# Patient Record
(demographics unavailable — no encounter records)

---

## 2025-03-04 NOTE — DATA REVIEWED
[FreeTextEntry1] : Reviewed reports and images from the most recent breast imaging studies and left breast core biopsy.  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

## 2025-03-04 NOTE — ASSESSMENT
[FreeTextEntry1] : [id number] left breast IDC, poorly differentiated, with breast prognostic profile pending.  The nature of the problem was discussed in full and further management decisions will be pending local review of the breast imaging studies and biopsy slides, medical oncology evaluation, BRCA testing, bilateral breast MRI, and case presentation at the multidisciplinary breast cancer conference.  BRCA testing is indicated in light of her premenopausal status, and the breast MRI will hopefully rule out the presence of any other areas of concern in either breast.  They understand that additional biopsies may be needed based on those results prior to definitive surgery.  We also had a long discussion regarding the details of surgical management options, including mastectomy with or without immediate reconstruction, versus wide local excision with preoperative localization and postoperative breast radiotherapy for management of the index lesion, and formal axillary lymph node dissection versus sentinel lymph node biopsy for surgical staging of the axilla.  The details of these procedures and their risks and benefits were also discussed in detail including cosmetic issues, the increased risk of locally recurrent disease with breast preservation, which could require a mastectomy in the future, and the very small risk of false negative axillary staging with sentinel node biopsy, which could have significant effects on her treatment decisions and possibly her prognosis.  Referrals were provided for the MRI, and consultation with medical oncology and our genetics counselor.  All their questions were answered and they are happy with the assessment and plan, they will return after the above have been completed for further discussion and final management decisions.

## 2025-03-04 NOTE — PHYSICAL EXAM
[Normal Thyroid] : the thyroid was normal [Normal Breath Sounds] : Normal breath sounds [Normal Heart Sounds] : normal heart sounds [Normal Rate and Rhythm] : normal rate and rhythm [No HSM] : no hepatosplenomegaly [de-identified] : Obese but otherwise healthy [de-identified] : No adenopathy [de-identified] : Large, symmetrical, pendulous.  No nipple discharge, nipple retraction, suspicious skin changes noted bilaterally.  No discrete masses or suspicious areas are palpable in either breast.  Healing core biopsy puncture in the left upper inner quadrant as noted, with minimal local ecchymosis and no palpable mass or hematoma.  No axillary adenopathy bilaterally.

## 2025-03-04 NOTE — HISTORY OF PRESENT ILLNESS
[de-identified] : The patient returns to me after an absence of almost 3 years, and comes with her male  following a recent abnormal mammogram which identified a new 13 mm mass in the left breast UIQ.  A subsequent core biopsy showed invasive carcinoma.  She was unaware of any new symptoms or changes in either breast, her only family history of breast cancer is a maternal aunt, and she continues to have regular menstrual cycles.  She has had 2 prior benign biopsies of the right breast. [de-identified] : She has had a recent 25 pound weight loss on Zepbound for the past 3 months.

## 2025-03-05 NOTE — HISTORY OF PRESENT ILLNESS
[Disease: _____________________] : Disease: [unfilled] [T: ___] : T[unfilled] [N: ___] : N[unfilled] [de-identified] :  This is a 47 year old woman who is being evaluated for breast cancer.  She was noted on screening mammogram (1/30/25) to have focal asymmetry in the left. Diagnostic mammogram and ultrasound on 2/10/25 showed at the 10-11 o'clock 7cm from the nipple an irregular mass measuring 0.8cm x 0.9cm x 1.3cm. 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 It was ER: 95 moderate/strong(2+/3+) /[place]: 70 moderate/strong (2+/3+)/HER 2: 0 NEG. Ki-67 index was 60%.  She recently had an appointment with Dr. Canela to discuss surgical options and was ordered an MRI of B/L breast w/wo contrast. Today she is here to discuss medical options for treatment.  Work up is as follows:  1/30/25 B/L Screening Mammogram  MAMMOGRAM FINDINGS: Mammography was performed including the following views: bilateral craniocaudal with tomosynthesis, bilateral mediolateral oblique with tomosynthesis.  The examination includes digital synthetic 2D and digital tomosynthesis 3D images. Additional imaging analysis was performed using CAD (computer-aided detection) software. The breasts are heterogeneously dense, which may obscure small masses. Finding 1:  There is a focal asymmetry seen in the medial left breast. Finding 2:  There are stable circumscribed masses seen in both breasts. IMPRESSION: Finding 1:  Focal asymmetry in the left breast requires additional evaluation. RECOMMENDATION: Patient will be recalled for additional mammographic views and breast ultrasound. Finding 2:  Stable masses in both breasts are benign finding. ASSESSMENT: BI-RADS Category 0:  Incomplete: Needs Additional Imaging Evaluation  2/10/25 Left Diagnostic Mammogram and US  Targeted Left Breast Sonogram: At the 10-11 o'clock location 7 cm from the nipple corresponding to mammographic findings is an irregular hypoechoic mass measuring 0.8 cm x 0.9 cm x 1.3 cm. An ultrasound guided core biopsy is recommended. Evaluation of the left axilla demonstrates normal-appearing lymph nodes. Impression: Left breast mass seen mammographically and sonographically at the 10-11 o'clock location 7 cm from the nipple as above. An ultrasound guided core biopsy is recommended. Recommendation: Ultrasound guided biopsy. BI-RADS Category 4: Suspicious  2/27/25 US Core Biopsy of the Left Breast Pathology:  BREAST, LEFT 10:00 N7 MASS, ULTRASOUND-GUIDED NEEDLE CORE BIOPSIES: - INVASIVE POORLY DIFFERENTIATED DUCTAL CARCINOMA WITH AN ASSOCIATED CHRONIC LYMPHOPLASMACYTIC CELL INFLAMMATORY INFILTRATE. Malignant concordant histology. Recommendation: Surgical consultation.  Addendum Immunohistochemical studies were performed at [facility], [address] [place] [address], [facility] (see NOTE).  The results are as follows:  % POSITIVE  STAINING INTENSITY  ESTROGEN RECEPTOR             95 MODERATE/STRONG (2+/3+) PROGESTERONE RECEPTOR    70 MODERATE/STRONG (2+/3+) Ki-67   60  Comment: No current consensus exists as to the methodological evaluation, optimal cutoff and algorithm of clinical usage of the Ki-67 labeling proliferative index. Proposed cutoffs noted in the literature vary from:  Unfavorable    >10% or >14% or >20% or 30% ([place]). Intermediate    10-14%, 10-20%, 16-30% ([place]). Favorable     <10% or <15% ([place]). A positive test result is defined as positive nuclear staining in >1% of tumor cells.                              A negative test result is defined as nuclear staining in <1% of tumor cells.  Marker           Result  Interpretation HER2               0 Negative  Pt is c/o pain in her left breast. Menstrual cycle is delayed over a week.   [de-identified] : IDC [de-identified] : /[place] + Her2 neg [de-identified] : 3/5/25 Pt being evaluated via Televisit. Consent obtained

## 2025-03-05 NOTE — CONSULT LETTER
[DrNeto  ___] : Dr. CHOE [DrNeto ___] : Dr. CHOE [___] : [unfilled] [Dear  ___] : Dear  [unfilled], [Courtesy Letter:] : I had the pleasure of seeing your patient, [unfilled], in my office today. [Please see my note below.] : Please see my note below. [Consult Closing:] : Thank you very much for allowing me to participate in the care of this patient.  If you have any questions, please do not hesitate to contact me. [Sincerely,] : Sincerely, [FreeTextEntry3] : Joshua Coronel MD  [facility] Attending Physician [facility] [phone]

## 2025-03-05 NOTE — ASSESSMENT
[FreeTextEntry1] : In summary this is a 47 yr old premenopausal woman with Clinical stage 1 ER+ NV+Her2-ve poorly diff IDC of left breast.  PLAN The oncologic history and management course thus far were reviewed in detail with the pt   We had a long discussion reviewing the natural history, staging and rationale for systemic treatment. I explained that the treatment of non-metastatic breast cancer is approached by 2 general principles  i.e local control and systemic control. I explained that she will need surgery either lumpectomy or mastectomy with SLN eval for local treatment. Adjuvant RT may be indicated based on choice of surgical procedure and pathology report. We explained that the goal of adjuvant systemic therapy is to reduce the risk of systemic recurrence or development of metastatic disease. We explained that metastatic disease although treatable is not curable. The object of adjuvant therapy is to reduce the risk of recurrence and mortality from metastatic disease.  We also use systemic treatment in a neoadjuvant approach in certain circumstances.  In order to determine need for neoadjuvant approach an MRI will give us info about the tumor size and extent of disease. We will also order a Mammaprint which will help in the decision process. If she has high MP-2 score neoadjuvant chemotherapy can be considered if tumor size is over 2cm on MRI.  Finally, we discussed the role of endocrine therapy which reduces the risk of both local and systemic recurrence and increases overall survival among women with HR + BC and should be strongly considered as adjuvant therapy for majority of women with ER/NV + BC. Since she is premenopausal we may consider OFS also.  Pt had many questions which were addressed in detail.  She will return for follow up after MRI is completed.

## 2025-03-05 NOTE — CONSULT LETTER
[DrNeto  ___] : Dr. CHOE [DrNeto ___] : Dr. CHOE [___] : [unfilled] [Dear  ___] : Dear  [unfilled], [Courtesy Letter:] : I had the pleasure of seeing your patient, [unfilled], in my office today. [Please see my note below.] : Please see my note below. [Consult Closing:] : Thank you very much for allowing me to participate in the care of this patient.  If you have any questions, please do not hesitate to contact me. [Sincerely,] : Sincerely, [FreeTextEntry3] : Joshua Coronel MD  [facility] Attending Physician [facility] [phone]

## 2025-03-05 NOTE — ASSESSMENT
[FreeTextEntry1] : In summary this is a 47 yr old premenopausal woman with Clinical stage 1 ER+ [place]+Her2-ve poorly diff IDC of left breast.  PLAN The oncologic history and management course thus far were reviewed in detail with the pt   We had a long discussion reviewing the natural history, staging and rationale for systemic treatment. I explained that the treatment of non-metastatic breast cancer is approached by 2 general principles  i.e local control and systemic control. I explained that she will need surgery either lumpectomy or mastectomy with SLN eval for local treatment. Adjuvant RT may be indicated based on choice of surgical procedure and pathology report. We explained that the goal of adjuvant systemic therapy is to reduce the risk of systemic recurrence or development of metastatic disease. We explained that metastatic disease although treatable is not curable. The object of adjuvant therapy is to reduce the risk of recurrence and mortality from metastatic disease.  We also use systemic treatment in a neoadjuvant approach in certain circumstances.  In order to determine need for neoadjuvant approach an MRI will give us info about the tumor size and extent of disease. We will also order a Mammaprint which will help in the decision process. If she has high MP-2 score neoadjuvant chemotherapy can be considered if tumor size is over 2cm on MRI.  Finally, we discussed the role of endocrine therapy which reduces the risk of both local and systemic recurrence and increases overall survival among women with HR + BC and should be strongly considered as adjuvant therapy for majority of women with ER/OH + BC. Since she is premenopausal we may consider OFS also.  Pt had many questions which were addressed in detail.  She will return for follow up after MRI is completed.

## 2025-03-05 NOTE — HISTORY OF PRESENT ILLNESS
[Disease: _____________________] : Disease: [unfilled] [T: ___] : T[unfilled] [N: ___] : N[unfilled] [de-identified] :  This is a 47 year old woman who is being evaluated for breast cancer.  She was noted on screening mammogram (1/30/25) to have focal asymmetry in the left. Diagnostic mammogram and ultrasound on 2/10/25 showed at the 10-11 o'clock 7cm from the nipple an irregular mass measuring 0.8cm x 0.9cm x 1.3cm. 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 It was ER: 95 moderate/strong(2+/3+) /[place]: 70 moderate/strong (2+/3+)/HER 2: 0 NEG. Ki-67 index was 60%.  She recently had an appointment with Dr. Canela to discuss surgical options and was ordered an MRI of B/L breast w/wo contrast. Today she is here to discuss medical options for treatment.  Work up is as follows:  1/30/25 B/L Screening Mammogram  MAMMOGRAM FINDINGS: Mammography was performed including the following views: bilateral craniocaudal with tomosynthesis, bilateral mediolateral oblique with tomosynthesis.  The examination includes digital synthetic 2D and digital tomosynthesis 3D images. Additional imaging analysis was performed using CAD (computer-aided detection) software. The breasts are heterogeneously dense, which may obscure small masses. Finding 1:  There is a focal asymmetry seen in the medial left breast. Finding 2:  There are stable circumscribed masses seen in both breasts. IMPRESSION: Finding 1:  Focal asymmetry in the left breast requires additional evaluation. RECOMMENDATION: Patient will be recalled for additional mammographic views and breast ultrasound. Finding 2:  Stable masses in both breasts are benign finding. ASSESSMENT: BI-RADS Category 0:  Incomplete: Needs Additional Imaging Evaluation  2/10/25 Left Diagnostic Mammogram and US  Targeted Left Breast Sonogram: At the 10-11 o'clock location 7 cm from the nipple corresponding to mammographic findings is an irregular hypoechoic mass measuring 0.8 cm x 0.9 cm x 1.3 cm. An ultrasound guided core biopsy is recommended. Evaluation of the left axilla demonstrates normal-appearing lymph nodes. Impression: Left breast mass seen mammographically and sonographically at the 10-11 o'clock location 7 cm from the nipple as above. An ultrasound guided core biopsy is recommended. Recommendation: Ultrasound guided biopsy. BI-RADS Category 4: Suspicious  2/27/25 US Core Biopsy of the Left Breast Pathology:  BREAST, LEFT 10:00 N7 MASS, ULTRASOUND-GUIDED NEEDLE CORE BIOPSIES: - INVASIVE POORLY DIFFERENTIATED DUCTAL CARCINOMA WITH AN ASSOCIATED CHRONIC LYMPHOPLASMACYTIC CELL INFLAMMATORY INFILTRATE. Malignant concordant histology. Recommendation: Surgical consultation.  Addendum Immunohistochemical studies were performed at [facility], [address] [place] [address], [facility] (see NOTE).  The results are as follows:  % POSITIVE  STAINING INTENSITY  ESTROGEN RECEPTOR             95 MODERATE/STRONG (2+/3+) PROGESTERONE RECEPTOR    70 MODERATE/STRONG (2+/3+) Ki-67   60  Comment: No current consensus exists as to the methodological evaluation, optimal cutoff and algorithm of clinical usage of the Ki-67 labeling proliferative index. Proposed cutoffs noted in the literature vary from:  Unfavorable    >10% or >14% or >20% or 30% ([place]). Intermediate    10-14%, 10-20%, 16-30% ([place]). Favorable     <10% or <15% ([place]). A positive test result is defined as positive nuclear staining in >1% of tumor cells.                              A negative test result is defined as nuclear staining in <1% of tumor cells.  Marker           Result  Interpretation HER2               0 Negative  Pt is c/o pain in her left breast. Menstrual cycle is delayed over a week.   [de-identified] : IDC [de-identified] : /[place] + Her2 neg [de-identified] : 3/5/25 Pt being evaluated via Televisit. Consent obtained

## 2025-03-13 NOTE — DISCUSSION/SUMMARY
[FreeTextEntry1] : REASON FOR VISIT: Ms. Juliann Hernández is a 47-year-old female who was referred by Dr. Joshua Coronel for cancer genetic counseling and risk assessment due to a personal history of breast cancer. The patient was seen on 3/12/2025 through [facility]. The patient was unaccompanied.   RELEVANT MEDICAL AND SURGICAL HISTORY: The patient was recently diagnosed with left breast invasive poorly differentiated ductal carcinoma, ER+/IA+/HER2-, Ki-67 60%. She met with Dr. Coronel (oncology). She will be going for a breast MRI  and following up with Dr. Coronel afterwards.  Disease: Breast Ca   Pathology: IDC   TNM stage: T1c, N0   Tumor Markers: ER+/IA+/HER2    OTHER MEDICAL AND SURGICAL HISTORY: -  HTN   PAST OB/GYN HISTORY: Obstetrical History:  (1 TOP) Age at Menarche: 11 Pre-Menopausal Oral Contraceptive Use: Former use Hormone Replacement Therapy: Denied    CANCER SCREENING HISTORY: Breast: Per pt hx of two breast biopsies in left breast which were inconclusive and had subsequent two lumpectomies. GYN: Last visit 2024. Frequency: annual. Patient reported no abnormalities. Colon: Scheduled for 2025 for baseline colonoscopy. Skin: Denied    SOCIAL HISTORY: Tobacco-product use: Former smoker, quit 2020 Environmental exposure: Denied    FAMILY HISTORY: Paternal ancestry was reported as Eastern /Ashkenazi-Roman Catholic and maternal ancestry was reported as Surinamese/[place]/. A detailed family history of cancer was ascertained, see below for pedigree. Of note: - Maternal aunt with breast and uterine cancer in her 70s/80s - Maternal aunt with abdominal cancer in her late 70s - Maternal aunt with breast cancer in her 80s - Maternal aunt with uterine cancer in her 60s - Maternal aunt with lung cancer -  Maternal grandfather with lung cancer - Father with basal cell    The remaining family history is unremarkable. According to the patient there are no other known cases of significant cancers in the family. To her knowledge no one else in the family has had germline testing for cancer susceptibility.   RISK ASSESSMENT: Ms. Hernández's family history of cancer is suggestive of an inherited predisposition to breast and uterine cancer and related cancers. Variants in breast and uterine cancer susceptibility genes were of specific concern. This test analyzes the following genes:   APC, MADIHA, BAP1, BARD1, BMPR1A, BRCA1, BRCA2, BRIP1, CDH1, CDKN2A, CHEK2, FH, FLCN, MET, MLH1, MSH2, MSH6, MUTYH, NF1, NTHL1, PALB2, PMS2, PTEN, RAD51C, RAD51D, SMAD4, STK11, TP53, TSC1, TSC2 and VHL (sequencing and deletion/duplication); AXIN2, HOXB13, MBD4, MSH3, POLD1 and POLE (sequencing only); EPCAM and GREM1 (deletion/duplication only)   We discussed the risks, benefits and limitations, financial cost and implications of genetic testing. We also discussed the psychosocial implications of genetic testing. Possible test results were reviewed with the patient, along with associated medical management options.   The patient consented to genetic testing for hereditary cancer. A sample was obtained from the patient in our clinic and will be sent to [facility] for analysis.   PLAN: 1. The patient's sample will be sent to [facility] for analysis. 2. We will contact the patient once the results are available. Results generally return in 2-3 weeks.   For any additional questions please call Cancer Genetics at [phone] or [phone].     Michela Gallego MS, [facility] Genetic Counselor, Cancer Genetics

## 2025-03-13 NOTE — DISCUSSION/SUMMARY
[FreeTextEntry1] : REASON FOR VISIT: Ms. Juliann Hernández is a 47-year-old female who was referred by Dr. Joshua Coronel for cancer genetic counseling and risk assessment due to a personal history of breast cancer. The patient was seen on 3/12/2025 through [facility]. The patient was unaccompanied.   RELEVANT MEDICAL AND SURGICAL HISTORY: The patient was recently diagnosed with left breast invasive poorly differentiated ductal carcinoma, ER+/[place]+/HER2-, Ki-67 60%. She met with Dr. Coronel (oncology). She will be going for a breast MRI  and following up with Dr. Coronel afterwards.  Disease: Breast Ca   Pathology: IDC   TNM stage: T1c, N0   Tumor Markers: ER+/NC+/HER2    OTHER MEDICAL AND SURGICAL HISTORY: -  HTN   PAST OB/GYN HISTORY: Obstetrical History:  (1 TOP) Age at Menarche: 11 Pre-Menopausal Oral Contraceptive Use: Former use Hormone Replacement Therapy: Denied    CANCER SCREENING HISTORY: Breast: Per pt hx of two breast biopsies in left breast which were inconclusive and had subsequent two lumpectomies. GYN: Last visit 2024. Frequency: annual. Patient reported no abnormalities. Colon: Scheduled for 2025 for baseline colonoscopy. Skin: Denied    SOCIAL HISTORY: Tobacco-product use: Former smoker, quit 2020 Environmental exposure: Denied    FAMILY HISTORY: Paternal ancestry was reported as Eastern /Ashkenazi-Methodist and maternal ancestry was reported as Lebanese/Macanese/. A detailed family history of cancer was ascertained, see below for pedigree. Of note: - Maternal aunt with breast and uterine cancer in her 70s/80s - Maternal aunt with abdominal cancer in her late 70s - Maternal aunt with breast cancer in her 80s - Maternal aunt with uterine cancer in her 60s - Maternal aunt with lung cancer -  Maternal grandfather with lung cancer - Father with basal cell    The remaining family history is unremarkable. According to the patient there are no other known cases of significant cancers in the family. To her knowledge no one else in the family has had germline testing for cancer susceptibility.   RISK ASSESSMENT: Ms. Hernández's family history of cancer is suggestive of an inherited predisposition to breast and uterine cancer and related cancers. Variants in breast and uterine cancer susceptibility genes were of specific concern. This test analyzes the following genes:   APC, MADIHA, BAP1, BARD1, BMPR1A, BRCA1, BRCA2, BRIP1, CDH1, CDKN2A, CHEK2, FH, FLCN, MET, MLH1, MSH2, MSH6, MUTYH, NF1, NTHL1, PALB2, PMS2, PTEN, RAD51C, RAD51D, SMAD4, STK11, TP53, TSC1, TSC2 and VHL (sequencing and deletion/duplication); AXIN2, HOXB13, MBD4, MSH3, POLD1 and POLE (sequencing only); EPCAM and GREM1 (deletion/duplication only)   We discussed the risks, benefits and limitations, financial cost and implications of genetic testing. We also discussed the psychosocial implications of genetic testing. Possible test results were reviewed with the patient, along with associated medical management options.   The patient consented to genetic testing for hereditary cancer. A sample was obtained from the patient in our clinic and will be sent to [facility] for analysis.   PLAN: 1. The patient's sample will be sent to [facility] for analysis. 2. We will contact the patient once the results are available. Results generally return in 2-3 weeks.   For any additional questions please call Cancer Genetics at [phone] or [phone].     Michela Gallego MS, [facility] [place] Genetic Counselor, Cancer Genetics

## 2025-03-19 NOTE — CONSULT LETTER
[Dear  ___] : Dear  [unfilled], [Courtesy Letter:] : I had the pleasure of seeing your patient, [unfilled], in my office today. [Please see my note below.] : Please see my note below. [Consult Closing:] : Thank you very much for allowing me to participate in the care of this patient.  If you have any questions, please do not hesitate to contact me. [Sincerely,] : Sincerely, [DrNeto  ___] : Dr. CHOE [DrNeto ___] : Dr. CHOE [___] : [unfilled] [FreeTextEntry3] : Joshua Coronel MD  [facility] Attending Physician [facility] [phone]

## 2025-03-19 NOTE — CONSULT LETTER
[Dear  ___] : Dear  [unfilled], [Courtesy Letter:] : I had the pleasure of seeing your patient, [unfilled], in my office today. [Please see my note below.] : Please see my note below. [Consult Closing:] : Thank you very much for allowing me to participate in the care of this patient.  If you have any questions, please do not hesitate to contact me. [Sincerely,] : Sincerely, [DrNeto  ___] : Dr. CHOE [DrNeto ___] : Dr. CHOE [___] : [unfilled] [FreeTextEntry3] : Joshua Coronel MD  [facility] Attending Physician [facility] [phone]  Surgical Defect Length In Cm (Optional): 2.0

## 2025-03-19 NOTE — HISTORY OF PRESENT ILLNESS
[Disease: _____________________] : Disease: [unfilled] [T: ___] : T[unfilled] [N: ___] : N[unfilled] [de-identified] :  This is a 47 year old woman who is being evaluated for breast cancer.  She was noted on screening mammogram (1/30/25) to have focal asymmetry in the left. Diagnostic mammogram and ultrasound on 2/10/25 showed at the 10-11 o'clock 7cm from the nipple an irregular mass measuring 0.8cm x 0.9cm x 1.3cm. 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 It was ER: 95 moderate/strong(2+/3+) /WI: 70 moderate/strong (2+/3+)/HER 2: 0 NEG. Ki-67 index was 60%.  She recently had an appointment with Dr. Canela to discuss surgical options and was ordered an MRI of B/L breast w/wo contrast. Today she is here to discuss medical options for treatment.  Work up is as follows:  1/30/25 B/L Screening Mammogram  MAMMOGRAM FINDINGS: Mammography was performed including the following views: bilateral craniocaudal with tomosynthesis, bilateral mediolateral oblique with tomosynthesis.  The examination includes digital synthetic 2D and digital tomosynthesis 3D images. Additional imaging analysis was performed using CAD (computer-aided detection) software. The breasts are heterogeneously dense, which may obscure small masses. Finding 1:  There is a focal asymmetry seen in the medial left breast. Finding 2:  There are stable circumscribed masses seen in both breasts. IMPRESSION: Finding 1:  Focal asymmetry in the left breast requires additional evaluation. RECOMMENDATION: Patient will be recalled for additional mammographic views and breast ultrasound. Finding 2:  Stable masses in both breasts are benign finding. ASSESSMENT: BI-RADS Category 0:  Incomplete: Needs Additional Imaging Evaluation  2/10/25 Left Diagnostic Mammogram and US  Targeted Left Breast Sonogram: At the 10-11 o'clock location 7 cm from the nipple corresponding to mammographic findings is an irregular hypoechoic mass measuring 0.8 cm x 0.9 cm x 1.3 cm. An ultrasound guided core biopsy is recommended. Evaluation of the left axilla demonstrates normal-appearing lymph nodes. Impression: Left breast mass seen mammographically and sonographically at the 10-11 o'clock location 7 cm from the nipple as above. An ultrasound guided core biopsy is recommended. Recommendation: Ultrasound guided biopsy. BI-RADS Category 4: Suspicious  2/27/25 US Core Biopsy of the Left Breast Pathology:  BREAST, LEFT 10:00 N7 MASS, ULTRASOUND-GUIDED NEEDLE CORE BIOPSIES: - INVASIVE POORLY DIFFERENTIATED DUCTAL CARCINOMA WITH AN ASSOCIATED CHRONIC LYMPHOPLASMACYTIC CELL INFLAMMATORY INFILTRATE. Malignant concordant histology. Recommendation: Surgical consultation.  Addendum Immunohistochemical studies were performed at [facility], [address] [place] [address], [facility] (see NOTE).  The results are as follows:  % POSITIVE  STAINING INTENSITY  ESTROGEN RECEPTOR             95 MODERATE/STRONG (2+/3+) PROGESTERONE RECEPTOR    70 MODERATE/STRONG (2+/3+) Ki-67   60  Comment: No current consensus exists as to the methodological evaluation, optimal cutoff and algorithm of clinical usage of the Ki-67 labeling proliferative index. Proposed cutoffs noted in the literature vary from:  Unfavorable    >10% or >14% or >20% or 30% ([place]). Intermediate    10-14%, 10-20%, 16-30% ([place]). Favorable     <10% or <15% ([place]). A positive test result is defined as positive nuclear staining in >1% of tumor cells.                              A negative test result is defined as nuclear staining in <1% of tumor cells.  Marker           Result  Interpretation HER2               0 Negative  Pt is c/o pain in her left breast. Menstrual cycle is delayed over a week.   [de-identified] : IDC [de-identified] : /MO + Her2 neg [de-identified] : 3/5/25 Pt being evaluated via Televisit. Consent obtained  3/17/25 Pt is here for follow up. She had MRI breast and mammaprint was ordered

## 2025-03-19 NOTE — PHYSICAL EXAM
[Fully active, able to carry on all pre-disease performance without restriction] : Status 0 - Fully active, able to carry on all pre-disease performance without restriction [Normal] : affect appropriate [de-identified] : No palpable masses

## 2025-03-19 NOTE — REASON FOR VISIT
[Follow-Up Visit] : a follow-up [Initial Consultation] : an initial consultation [FreeTextEntry2] : Breast CA

## 2025-03-19 NOTE — ASSESSMENT
[FreeTextEntry1] : In summary this is a 47 yr old premenopausal woman with Clinical stage II T2N0 ER+ AL+Her2-ve poorly diff IDC of left breast. MAMMAPRINT SHOWS HIGH RISK -1 LUMINAL subtype Probability of pCR wih neoadjuvant chemotherapy 6% MRI showed a non-mass enhancement in inferior RT breast, MRI guided biopsy rec PLAN The oncologic history and management course thus far were reviewed in detail with the pt   We had a long discussion reviewing the natural history, staging and rationale for systemic treatment. I explained that the treatment of non-metastatic breast cancer is approached by 2 general principles  i.e local control and systemic control. Given the MP- HIGH RISK -1 result she can proceed with surgery first and systemic chemotherapy options will be discussed once final pathology is reviewed. I explained that  surgery could be either lumpectomy or mastectomy with SLN eval.  This will be discussed further by Dr Canela.  Adjuvant RT may be indicated based on choice of surgical procedure and pathology report.  We explained that the goal of adjuvant systemic therapy is to reduce the risk of systemic recurrence or development of metastatic disease. We explained that metastatic disease although treatable is not curable. The object of adjuvant therapy is to reduce the risk of recurrence and mortality from metastatic disease.   Finally, we discussed the role of endocrine therapy which reduces the risk of both local and systemic recurrence and increases overall survival among women with HR + BC and should be strongly considered as adjuvant therapy for majority of women with ER/AL + BC. Since she is premenopausal, we may consider OFS also.  I also discussed the case with Dr Canela who is arranging MRI guided biopsy of RT breast. Genetic testing has been completed and results are pending.  Pt had many questions which were addressed in detail.  RTC after surgery

## 2025-03-19 NOTE — PHYSICAL EXAM
[Fully active, able to carry on all pre-disease performance without restriction] : Status 0 - Fully active, able to carry on all pre-disease performance without restriction [Normal] : affect appropriate [de-identified] : No palpable masses

## 2025-03-19 NOTE — ASSESSMENT
[FreeTextEntry1] : In summary this is a 47 yr old premenopausal woman with Clinical stage II T2N0 ER+ RI+Her2-ve poorly diff IDC of left breast. MAMMAPRINT SHOWS HIGH RISK -1 LUMINAL subtype Probability of pCR wih neoadjuvant chemotherapy 6% MRI showed a non-mass enhancement in inferior RT breast, MRI guided biopsy rec PLAN The oncologic history and management course thus far were reviewed in detail with the pt   We had a long discussion reviewing the natural history, staging and rationale for systemic treatment. I explained that the treatment of non-metastatic breast cancer is approached by 2 general principles  i.e local control and systemic control. Given the MP- HIGH RISK -1 result she can proceed with surgery first and systemic chemotherapy options will be discussed once final pathology is reviewed. I explained that  surgery could be either lumpectomy or mastectomy with SLN eval.  This will be discussed further by Dr Canela.  Adjuvant RT may be indicated based on choice of surgical procedure and pathology report.  We explained that the goal of adjuvant systemic therapy is to reduce the risk of systemic recurrence or development of metastatic disease. We explained that metastatic disease although treatable is not curable. The object of adjuvant therapy is to reduce the risk of recurrence and mortality from metastatic disease.   Finally, we discussed the role of endocrine therapy which reduces the risk of both local and systemic recurrence and increases overall survival among women with HR + BC and should be strongly considered as adjuvant therapy for majority of women with ER/RI + BC. Since she is premenopausal, we may consider OFS also.  I also discussed the case with Dr Canela who is arranging MRI guided biopsy of RT breast. Genetic testing has been completed and results are pending.  Pt had many questions which were addressed in detail.  RTC after surgery

## 2025-03-19 NOTE — HISTORY OF PRESENT ILLNESS
[Disease: _____________________] : Disease: [unfilled] [T: ___] : T[unfilled] [N: ___] : N[unfilled] [de-identified] :  This is a 47 year old woman who is being evaluated for breast cancer.  She was noted on screening mammogram (1/30/25) to have focal asymmetry in the left. Diagnostic mammogram and ultrasound on 2/10/25 showed at the 10-11 o'clock 7cm from the nipple an irregular mass measuring 0.8cm x 0.9cm x 1.3cm. 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 It was ER: 95 moderate/strong(2+/3+) /NV: 70 moderate/strong (2+/3+)/HER 2: 0 NEG. Ki-67 index was 60%.  She recently had an appointment with Dr. Canela to discuss surgical options and was ordered an MRI of B/L breast w/wo contrast. Today she is here to discuss medical options for treatment.  Work up is as follows:  1/30/25 B/L Screening Mammogram  MAMMOGRAM FINDINGS: Mammography was performed including the following views: bilateral craniocaudal with tomosynthesis, bilateral mediolateral oblique with tomosynthesis.  The examination includes digital synthetic 2D and digital tomosynthesis 3D images. Additional imaging analysis was performed using CAD (computer-aided detection) software. The breasts are heterogeneously dense, which may obscure small masses. Finding 1:  There is a focal asymmetry seen in the medial left breast. Finding 2:  There are stable circumscribed masses seen in both breasts. IMPRESSION: Finding 1:  Focal asymmetry in the left breast requires additional evaluation. RECOMMENDATION: Patient will be recalled for additional mammographic views and breast ultrasound. Finding 2:  Stable masses in both breasts are benign finding. ASSESSMENT: BI-RADS Category 0:  Incomplete: Needs Additional Imaging Evaluation  2/10/25 Left Diagnostic Mammogram and US  Targeted Left Breast Sonogram: At the 10-11 o'clock location 7 cm from the nipple corresponding to mammographic findings is an irregular hypoechoic mass measuring 0.8 cm x 0.9 cm x 1.3 cm. An ultrasound guided core biopsy is recommended. Evaluation of the left axilla demonstrates normal-appearing lymph nodes. Impression: Left breast mass seen mammographically and sonographically at the 10-11 o'clock location 7 cm from the nipple as above. An ultrasound guided core biopsy is recommended. Recommendation: Ultrasound guided biopsy. BI-RADS Category 4: Suspicious  2/27/25 US Core Biopsy of the Left Breast Pathology:  BREAST, LEFT 10:00 N7 MASS, ULTRASOUND-GUIDED NEEDLE CORE BIOPSIES: - INVASIVE POORLY DIFFERENTIATED DUCTAL CARCINOMA WITH AN ASSOCIATED CHRONIC LYMPHOPLASMACYTIC CELL INFLAMMATORY INFILTRATE. Malignant concordant histology. Recommendation: Surgical consultation.  Addendum Immunohistochemical studies were performed at [facility], [address] [place] [address], [facility] (see NOTE).  The results are as follows:  % POSITIVE  STAINING INTENSITY  ESTROGEN RECEPTOR             95 MODERATE/STRONG (2+/3+) PROGESTERONE RECEPTOR    70 MODERATE/STRONG (2+/3+) Ki-67   60  Comment: No current consensus exists as to the methodological evaluation, optimal cutoff and algorithm of clinical usage of the Ki-67 labeling proliferative index. Proposed cutoffs noted in the literature vary from:  Unfavorable    >10% or >14% or >20% or 30% ([place]). Intermediate    10-14%, 10-20%, 16-30% ([place]). Favorable     <10% or <15% ([place]). A positive test result is defined as positive nuclear staining in >1% of tumor cells.                              A negative test result is defined as nuclear staining in <1% of tumor cells.  Marker           Result  Interpretation HER2               0 Negative  Pt is c/o pain in her left breast. Menstrual cycle is delayed over a week.   [de-identified] : IDC [de-identified] : /DE + Her2 neg [de-identified] : 3/5/25 Pt being evaluated via Televisit. Consent obtained  3/17/25 Pt is here for follow up. She had MRI breast and mammaprint was ordered

## 2025-03-30 NOTE — HISTORY OF PRESENT ILLNESS
[de-identified] : The patient returns for final discussion regarding the management of her left breast carcinoma.  She notes no new symptoms or changes in either breast and was not reexamined at this office visit.  Breast MRI showed the index lesion in the left breast, and a 3 cm area of non-mass enhancement in the right breast for which a biopsy is scheduled for tomorrow under MRI guidance.  No other breast lesions were noted and no regional adenopathy was appreciated.  BRCA testing has been submitted and results are still pending.  Index lesion is a 13 mm poorly differentiated IDC, ER and IL positive, H ER 2 negative, Ki-67 60%.  MammaPrint shows this to be a luminal B lesion, high risk type I

## 2025-03-30 NOTE — ASSESSMENT
[FreeTextEntry1] : [id number] Left breast IDC, with a favorable prognostic profile with the exception of the Ki-67 and MammaPrint index.  The nature of the problem was reviewed in detail, along with her management options.  Pending the results of the remaining testing, we will proceed with definitive surgery.  We discussed mastectomy, with or without immediate reconstruction as opposed to wide local excision with preoperative localization and postop breast radiotherapy for management of the index lesion.  We discussed axillary lymph node dissection versus sentinel lymph node biopsy for surgical staging of the axilla.  The details of these procedures and their relative risks and benefits were outlined in full.  She understands the increased risk of local recurrence with breast preservation, which could possibly require a mastectomy but will likely not change her overall life expectancy if it does occur.  We discussed the possibility of a false negative sentinel lymph node biopsy, which would have obvious effects on her staging, her treatment decisions, and possibly her prognosis.  All her questions were answered and she wishes to proceed with breast preservation and sentinel node biopsy.  An appropriate surgical consent was obtained and the procedure will be scheduled promptly.

## 2025-03-31 NOTE — DISCUSSION/SUMMARY
[FreeTextEntry1] : REASON FOR VISIT: Ms. Juliann Hernández is a 47-year-old female who was called on 3/31/2025 for a discussion regarding her uncertain genetic test results related to hereditary cancer predisposition.    RELEVANT MEDICAL AND SURGICAL HISTORY: The patient was recently diagnosed with left breast invasive poorly differentiated ductal carcinoma, ER+/ME+/HER2-, Ki-67 60%. She met with Dr. Coronel (oncology). She will be going for a breast MRI  and following up with Dr. Coronel afterwards.  Disease: Breast Ca Pathology: IDC TNM stage: T1c, N0 Tumor Markers: ER+/ME+/HER2  OTHER MEDICAL AND SURGICAL HISTORY: - HTN  PAST OB/GYN HISTORY: Obstetrical History:  (1 TOP) Age at Menarche: 11 Pre-Menopausal Oral Contraceptive Use: Former use Hormone Replacement Therapy: Denied  CANCER SCREENING HISTORY: Breast: Per pt hx of two breast biopsies in left breast which were inconclusive and had subsequent two lumpectomies. GYN: Last visit 2024. Frequency: annual. Patient reported no abnormalities. Colon: Scheduled for 2025 for baseline colonoscopy. Skin: Denied  SOCIAL HISTORY: Tobacco-product use: Former smoker, quit  Environmental exposure: Denied  FAMILY HISTORY: Paternal ancestry was reported as Eastern /Ashkenazi-Confucianism and maternal ancestry was reported as Tajik/Finnish/. A detailed family history of cancer was ascertained, see below for pedigree. Of note: - Maternal aunt with breast and uterine cancer in her 70s/80s - Maternal aunt with abdominal cancer in her late 70s - Maternal aunt with breast cancer in her 80s - Maternal aunt with uterine cancer in her 60s - Maternal aunt with lung cancer - Maternal grandfather with lung cancer - Father with basal cell  The remaining family history is unremarkable. According to the patient there are no other known cases of significant cancers in the family. To her knowledge no one else in the family has had germline testing for cancer susceptibility.  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 Hernández's uncertain genetic test result. At this time the available evidence is insufficient to determine the role of this variant in disease and the clinical significance of these results are uncertain. The AXIN2 gene is associated with autosomal dominant oligodontia-colorectal cancer syndrome. It is unknown if the patient has an increased risk for the cancers associated with AXIN2. Of note, the patient denied any personal history of oligodontia or family history of colon cancer.   The detection of this VUS does NOT currently change the patient's medical management. It is NOT recommended at this time that family members use this result for predictive genetic testing or medical management decisions. With more research, a VUS may be reclassified as either disease-causing or benign. The patient was encouraged to contact us every 2-3 years to enquire about any new information for this variant, or sooner if there are any changes in her personal or family history of cancer.  Such updates could possibly change our risk assessment and recommendations.   Given Ms. Hernández's current reported personal and family history of cancer, and her uncertain genetic test results, the following screening guidelines and risk-reducing recommendations were discussed:  Breast: Long-term management and surveillance should be based on the patients on- or post-treatment protocol as recommended by her surgeons and/or oncologist.  Other: In the absence of other indications, the patient should practice age-appropriate cancer screening for all other organ systems as recommended for the general population.   It is recommended that the patient discuss the importance of pursuing cancer genetic testing and counseling with her other relatives. There may be a pathogenic variant in the family which the patient did not inherit. We would be happy to meet with her family members or refer them to a genetic expert in their area.   We also discussed that, while no clear cause of the patient's personal and family history of cancer was identified, this result, while reassuring, does entirely not rule out a hereditary cancer risk in the patient. It is possible, although unlikely, the patient has a mutation in one of the genes tested that is not detectable by this analysis, or has a mutation in a different gene, either known or unknown. It is also possible there is a hereditary cancer predisposition in the family, but the patient did not inherit it.    Our knowledge of genetics and inherited cancer conditions is changing rapidly, therefore, we recommend that the patient contact our office, every 2 to 3 years, to discuss relevant advances in cancer genetics. We emphasize the importance of re-contacting us with updates regarding her personal and family history of cancer as well as any updates regarding additional cancer genetic test results performed for the patient and/or family members. Such updates could possibly change our risk assessment and recommendations.   PLAN: 1. Long-term management and surveillance should be based on the patient's personal and family history and general population guidelines for all other cancers. 2. A copy of the genetic test results is available to the patient in the [employer] portal. 3. The patient was encouraged to contact us every 2-3 years to discuss relevant advances in cancer genetics, or sooner if there are any changes in her personal or family history of cancer.   For any additional questions please call Cancer Genetics at [phone] or [phone].     Michela Gallego MS, [facility] Genetic Counselor, Cancer Genetics

## 2025-04-08 NOTE — REASON FOR VISIT
Daughter was given all Dr ERICK Castellanos's annotations and orders and verbalized understanding.  [facility] to RN fax now and as soon as it was received we will place on Dr ERICK Castellanos's desk to review.  Pt will see Franck Sousa NP 4/9/25 and then will see his cardiologist 4/10/25 Dr Abraham . Dr Whitehead office has daughter holding the Losartan for now and they sent a lower dose of the Cardizem to pts pharmacy today, daughter not sure of the dose but will  tonight   [Consultation] : a consultation visit [Friend] : friend

## 2025-04-28 NOTE — ASSESSMENT
[FreeTextEntry1] : [id number] G3 left breast IDC, poorly differentiated but with no LVI, hormone receptor positive, H ER 2 negative high Ki-67.  The nature of the problem was discussed in full.  The presence of the individual tumor cells in the node does not change her staging and should not affect her overall management decisions.  The margin positivity was also discussed in detail and prior to any adjuvant treatment, we will plan for reexcision of the lumpectomy site margins as noted.  The details of this procedure were discussed in full and all her questions were answered.  We will also plan for medical and radiation oncology evaluations.  She will likely be recommended to receive adjuvant endocrine therapy but the question of systemic chemotherapy is still to be settled.  All of her questions were answered and an appropriate surgical consent was obtained.  Local care instructions were reviewed for the interim and she will contact me promptly should any other questions or problems arise.

## 2025-04-28 NOTE — DATA REVIEWED
[FreeTextEntry1] : Final pathology shows an 18 mm G3 invasive ductal carcinoma, poorly differentiated, with associated DCIS but no LVI.  The margins are clear of the invasive component but there is DCIS involving the lateral inferior margin.  Prognostic profile is ER and OR positive, H ER 2 negative, Ki-67 60%.  2 sentinel lymph nodes were retrieved 1 of which was benign and 1 of which showed a minute cluster of individual tumor cells.

## 2025-04-28 NOTE — HISTORY OF PRESENT ILLNESS
[de-identified] : The patient returns for her first postoperative visit after her recent left breast lumpectomy and sentinel node biopsy.  She looks and feels well and has no specific complaints regarding the surgery.  She did not require any prescription analgesics.

## 2025-04-28 NOTE — PHYSICAL EXAM
[Normal Thyroid] : the thyroid was normal [Normal Breath Sounds] : Normal breath sounds [Normal Heart Sounds] : normal heart sounds [Normal Rate and Rhythm] : normal rate and rhythm [de-identified] : Healthy [de-identified] : No adenopathy [de-identified] : Large and symmetrical.  Right breast examination is unremarkable.  Left breast reveals 2 surgical incisions which are clean and dry, with no evidence of hematoma or infection.  There is a palpable seroma at the lumpectomy site which was not aspirated.

## 2025-06-06 NOTE — BEGINNING OF VISIT
[Other reason not done] : Other reason not done [Former] : Former [0-4] : 0-4 [< 15 Years] : < 15 Years [Date Discussed (MM/DD/YY): ___] : Discussed: [unfilled] [With Patient/Caregiver] : with Patient/Caregiver [de-identified] : sees therapist

## 2025-06-06 NOTE — BEGINNING OF VISIT
[Other reason not done] : Other reason not done [Former] : Former [0-4] : 0-4 [< 15 Years] : < 15 Years [Date Discussed (MM/DD/YY): ___] : Discussed: [unfilled] [With Patient/Caregiver] : with Patient/Caregiver [de-identified] : sees therapist

## 2025-06-06 NOTE — BEGINNING OF VISIT
[Other reason not done] : Other reason not done [Former] : Former [0-4] : 0-4 [< 15 Years] : < 15 Years [Date Discussed (MM/DD/YY): ___] : Discussed: [unfilled] [With Patient/Caregiver] : with Patient/Caregiver [de-identified] : sees therapist

## 2025-06-06 NOTE — BEGINNING OF VISIT
[Other reason not done] : Other reason not done [Former] : Former [0-4] : 0-4 [< 15 Years] : < 15 Years [Date Discussed (MM/DD/YY): ___] : Discussed: [unfilled] [With Patient/Caregiver] : with Patient/Caregiver [de-identified] : sees therapist

## 2025-06-06 NOTE — BEGINNING OF VISIT
[Other reason not done] : Other reason not done [Former] : Former [0-4] : 0-4 [< 15 Years] : < 15 Years [Date Discussed (MM/DD/YY): ___] : Discussed: [unfilled] [With Patient/Caregiver] : with Patient/Caregiver [de-identified] : sees therapist

## 2025-06-08 NOTE — ASSESSMENT
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  The lesion is ER positive and H ER 2 negative with a high Ki-67 and an Oncotype RS of 32.  Full local care and activity instructions were reviewed and she will return here in about 4 weeks.  She will follow-up with the oncologist regarding her adjuvant treatment and a radiation oncology consultation will also be obtained.  Although one of her margins is only clear by 1 mm, I do not see the benefit of reexcision as it is highly likely to be negative, along with the fact that she will be receiving both adjuvant chemotherapy followed by breast radiation and subsequent endocrine therapy.  This was discussed in detail and all her questions were answered.

## 2025-06-08 NOTE — DATA REVIEWED
[FreeTextEntry1] : Oncotype recurrence score was 32 and she has already discussed this with the oncologist with plans for adjuvant chemotherapy.  Final pathology shows residual DCIS in each of the reexcision specimens, but the margins were clean, at 4 mm in 1 specimen and 1 mm in the other

## 2025-06-08 NOTE — PHYSICAL EXAM
[de-identified] : There is mild flattening at the lumpectomy site and the incision is clean and dry.  There is a palpable seroma which was not aspirated.  The axillary incision also continues to heal without problems.

## 2025-06-08 NOTE — HISTORY OF PRESENT ILLNESS
[de-identified] : The patient returns for her first postoperative visit after the recent left breast reexcision for margin positivity.  She looks well and has no complaints regarding the surgery other than some minor postoperative discomfort.

## 2025-06-13 NOTE — HISTORY OF PRESENT ILLNESS
[Disease: _____________________] : Disease: [unfilled] [T: ___] : T[unfilled] [N: ___] : N[unfilled] [de-identified] :  This is a 47 year old woman who is being evaluated for breast cancer.  She was noted on screening mammogram (1/30/25) to have focal asymmetry in the left. Diagnostic mammogram and ultrasound on 2/10/25 showed at the 10-11 o'clock 7cm from the nipple an irregular mass measuring 0.8cm x 0.9cm x 1.3cm. 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 It was ER: 95 moderate/strong(2+/3+) /IA: 70 moderate/strong (2+/3+)/HER 2: 0 NEG. Ki-67 index was 60%.  She recently had an appointment with Dr. Canela to discuss surgical options and was ordered an MRI of B/L breast w/wo contrast. Today she is here to discuss medical options for treatment.  Work up is as follows:  1/30/25 B/L Screening Mammogram  MAMMOGRAM FINDINGS: Mammography was performed including the following views: bilateral craniocaudal with tomosynthesis, bilateral mediolateral oblique with tomosynthesis.  The examination includes digital synthetic 2D and digital tomosynthesis 3D images. Additional imaging analysis was performed using CAD (computer-aided detection) software. The breasts are heterogeneously dense, which may obscure small masses. Finding 1:  There is a focal asymmetry seen in the medial left breast. Finding 2:  There are stable circumscribed masses seen in both breasts. IMPRESSION: Finding 1:  Focal asymmetry in the left breast requires additional evaluation. RECOMMENDATION: Patient will be recalled for additional mammographic views and breast ultrasound. Finding 2:  Stable masses in both breasts are benign finding. ASSESSMENT: BI-RADS Category 0:  Incomplete: Needs Additional Imaging Evaluation  2/10/25 Left Diagnostic Mammogram and US  Targeted Left Breast Sonogram: At the 10-11 o'clock location 7 cm from the nipple corresponding to mammographic findings is an irregular hypoechoic mass measuring 0.8 cm x 0.9 cm x 1.3 cm. An ultrasound guided core biopsy is recommended. Evaluation of the left axilla demonstrates normal-appearing lymph nodes. Impression: Left breast mass seen mammographically and sonographically at the 10-11 o'clock location 7 cm from the nipple as above. An ultrasound guided core biopsy is recommended. Recommendation: Ultrasound guided biopsy. BI-RADS Category 4: Suspicious  2/27/25 US Core Biopsy of the Left Breast Pathology:  BREAST, LEFT 10:00 N7 MASS, ULTRASOUND-GUIDED NEEDLE CORE BIOPSIES: - INVASIVE POORLY DIFFERENTIATED DUCTAL CARCINOMA WITH AN ASSOCIATED CHRONIC LYMPHOPLASMACYTIC CELL INFLAMMATORY INFILTRATE. Malignant concordant histology. Recommendation: Surgical consultation.  Addendum Immunohistochemical studies were performed at [facility], [address] [place] [address], [facility] (see NOTE).  The results are as follows:  % POSITIVE  STAINING INTENSITY  ESTROGEN RECEPTOR             95 MODERATE/STRONG (2+/3+) PROGESTERONE RECEPTOR    70 MODERATE/STRONG (2+/3+) Ki-67   60  Comment: No current consensus exists as to the methodological evaluation, optimal cutoff and algorithm of clinical usage of the Ki-67 labeling proliferative index. Proposed cutoffs noted in the literature vary from:  Unfavorable    >10% or >14% or >20% or 30% ([place]). Intermediate    10-14%, 10-20%, 16-30% ([place]). Favorable     <10% or <15% ([place]). A positive test result is defined as positive nuclear staining in >1% of tumor cells.                              A negative test result is defined as nuclear staining in <1% of tumor cells.  Marker           Result  Interpretation HER2               0 Negative  Pt is c/o pain in her left breast. Menstrual cycle is delayed over a week.   [de-identified] : IDC [de-identified] : /TX + Her2 neg [de-identified] : 3/5/25 Pt being evaluated via Televisit. Consent obtained  3/17/25 Pt is here for follow up. She had MRI breast and mammaprint was ordered  6/2/25  Patient is here for follow up for Breast CA Completed Lumpectomy revision on 5/20/25 Completed [facility] consult on 5/13/25 Last menstrual period on 6/2/25

## 2025-06-13 NOTE — HISTORY OF PRESENT ILLNESS
[Disease: _____________________] : Disease: [unfilled] [T: ___] : T[unfilled] [N: ___] : N[unfilled] [de-identified] :  This is a 47 year old woman who is being evaluated for breast cancer.  She was noted on screening mammogram (1/30/25) to have focal asymmetry in the left. Diagnostic mammogram and ultrasound on 2/10/25 showed at the 10-11 o'clock 7cm from the nipple an irregular mass measuring 0.8cm x 0.9cm x 1.3cm. 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 It was ER: 95 moderate/strong(2+/3+) /WV: 70 moderate/strong (2+/3+)/HER 2: 0 NEG. Ki-67 index was 60%.  She recently had an appointment with Dr. Canela to discuss surgical options and was ordered an MRI of B/L breast w/wo contrast. Today she is here to discuss medical options for treatment.  Work up is as follows:  1/30/25 B/L Screening Mammogram  MAMMOGRAM FINDINGS: Mammography was performed including the following views: bilateral craniocaudal with tomosynthesis, bilateral mediolateral oblique with tomosynthesis.  The examination includes digital synthetic 2D and digital tomosynthesis 3D images. Additional imaging analysis was performed using CAD (computer-aided detection) software. The breasts are heterogeneously dense, which may obscure small masses. Finding 1:  There is a focal asymmetry seen in the medial left breast. Finding 2:  There are stable circumscribed masses seen in both breasts. IMPRESSION: Finding 1:  Focal asymmetry in the left breast requires additional evaluation. RECOMMENDATION: Patient will be recalled for additional mammographic views and breast ultrasound. Finding 2:  Stable masses in both breasts are benign finding. ASSESSMENT: BI-RADS Category 0:  Incomplete: Needs Additional Imaging Evaluation  2/10/25 Left Diagnostic Mammogram and US  Targeted Left Breast Sonogram: At the 10-11 o'clock location 7 cm from the nipple corresponding to mammographic findings is an irregular hypoechoic mass measuring 0.8 cm x 0.9 cm x 1.3 cm. An ultrasound guided core biopsy is recommended. Evaluation of the left axilla demonstrates normal-appearing lymph nodes. Impression: Left breast mass seen mammographically and sonographically at the 10-11 o'clock location 7 cm from the nipple as above. An ultrasound guided core biopsy is recommended. Recommendation: Ultrasound guided biopsy. BI-RADS Category 4: Suspicious  2/27/25 US Core Biopsy of the Left Breast Pathology:  BREAST, LEFT 10:00 N7 MASS, ULTRASOUND-GUIDED NEEDLE CORE BIOPSIES: - INVASIVE POORLY DIFFERENTIATED DUCTAL CARCINOMA WITH AN ASSOCIATED CHRONIC LYMPHOPLASMACYTIC CELL INFLAMMATORY INFILTRATE. Malignant concordant histology. Recommendation: Surgical consultation.  Addendum Immunohistochemical studies were performed at [facility], [address] [place] [address], [facility] (see NOTE).  The results are as follows:  % POSITIVE  STAINING INTENSITY  ESTROGEN RECEPTOR             95 MODERATE/STRONG (2+/3+) PROGESTERONE RECEPTOR    70 MODERATE/STRONG (2+/3+) Ki-67   60  Comment: No current consensus exists as to the methodological evaluation, optimal cutoff and algorithm of clinical usage of the Ki-67 labeling proliferative index. Proposed cutoffs noted in the literature vary from:  Unfavorable    >10% or >14% or >20% or 30% ([place]). Intermediate    10-14%, 10-20%, 16-30% ([place]). Favorable     <10% or <15% ([place]). A positive test result is defined as positive nuclear staining in >1% of tumor cells.                              A negative test result is defined as nuclear staining in <1% of tumor cells.  Marker           Result  Interpretation HER2               0 Negative  Pt is c/o pain in her left breast. Menstrual cycle is delayed over a week.   [de-identified] : IDC [de-identified] : /OR + Her2 neg [de-identified] : 3/5/25 Pt being evaluated via Televisit. Consent obtained  3/17/25 Pt is here for follow up. She had MRI breast and mammaprint was ordered  6/2/25  Patient is here for follow up for Breast CA Completed Lumpectomy revision on 5/20/25 Completed [facility] consult on 5/13/25 Last menstrual period on 6/2/25

## 2025-06-13 NOTE — CONSULT LETTER
[Dear  ___] : Dear  [unfilled], [Courtesy Letter:] : I had the pleasure of seeing your patient, [unfilled], in my office today. [Please see my note below.] : Please see my note below. [Consult Closing:] : Thank you very much for allowing me to participate in the care of this patient.  If you have any questions, please do not hesitate to contact me. [Sincerely,] : Sincerely, [DrNeto  ___] : Dr. CHOE [DrNeto ___] : Dr. CHOE [___] : [unfilled] [FreeTextEntry3] : Joshua Coronel MD  [facility] \[facility] [place] [facility]\""/[facility] Attending Physician [facility] [phone]

## 2025-06-13 NOTE — CONSULT LETTER
[Dear  ___] : Dear  [unfilled], [Courtesy Letter:] : I had the pleasure of seeing your patient, [unfilled], in my office today. [Please see my note below.] : Please see my note below. [Consult Closing:] : Thank you very much for allowing me to participate in the care of this patient.  If you have any questions, please do not hesitate to contact me. [Sincerely,] : Sincerely, [DrNeto  ___] : Dr. CHOE [DrNeto ___] : Dr. CHOE [___] : [unfilled] [FreeTextEntry3] : Joshua Coronel MD  [facility] Attending Physician [facility] [phone]

## 2025-06-13 NOTE — HISTORY OF PRESENT ILLNESS
[Disease: _____________________] : Disease: [unfilled] [T: ___] : T[unfilled] [N: ___] : N[unfilled] [de-identified] :  This is a 47 year old woman who is being evaluated for breast cancer.  She was noted on screening mammogram (1/30/25) to have focal asymmetry in the left. Diagnostic mammogram and ultrasound on 2/10/25 showed at the 10-11 o'clock 7cm from the nipple an irregular mass measuring 0.8cm x 0.9cm x 1.3cm. 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 It was ER: 95 moderate/strong(2+/3+) /ND: 70 moderate/strong (2+/3+)/HER 2: 0 NEG. Ki-67 index was 60%.  She recently had an appointment with Dr. Canela to discuss surgical options and was ordered an MRI of B/L breast w/wo contrast. Today she is here to discuss medical options for treatment.  Work up is as follows:  1/30/25 B/L Screening Mammogram  MAMMOGRAM FINDINGS: Mammography was performed including the following views: bilateral craniocaudal with tomosynthesis, bilateral mediolateral oblique with tomosynthesis.  The examination includes digital synthetic 2D and digital tomosynthesis 3D images. Additional imaging analysis was performed using CAD (computer-aided detection) software. The breasts are heterogeneously dense, which may obscure small masses. Finding 1:  There is a focal asymmetry seen in the medial left breast. Finding 2:  There are stable circumscribed masses seen in both breasts. IMPRESSION: Finding 1:  Focal asymmetry in the left breast requires additional evaluation. RECOMMENDATION: Patient will be recalled for additional mammographic views and breast ultrasound. Finding 2:  Stable masses in both breasts are benign finding. ASSESSMENT: BI-RADS Category 0:  Incomplete: Needs Additional Imaging Evaluation  2/10/25 Left Diagnostic Mammogram and US  Targeted Left Breast Sonogram: At the 10-11 o'clock location 7 cm from the nipple corresponding to mammographic findings is an irregular hypoechoic mass measuring 0.8 cm x 0.9 cm x 1.3 cm. An ultrasound guided core biopsy is recommended. Evaluation of the left axilla demonstrates normal-appearing lymph nodes. Impression: Left breast mass seen mammographically and sonographically at the 10-11 o'clock location 7 cm from the nipple as above. An ultrasound guided core biopsy is recommended. Recommendation: Ultrasound guided biopsy. BI-RADS Category 4: Suspicious  2/27/25 US Core Biopsy of the Left Breast Pathology:  BREAST, LEFT 10:00 N7 MASS, ULTRASOUND-GUIDED NEEDLE CORE BIOPSIES: - INVASIVE POORLY DIFFERENTIATED DUCTAL CARCINOMA WITH AN ASSOCIATED CHRONIC LYMPHOPLASMACYTIC CELL INFLAMMATORY INFILTRATE. Malignant concordant histology. Recommendation: Surgical consultation.  Addendum Immunohistochemical studies were performed at [facility], [address] [place] [address], [facility] (see NOTE).  The results are as follows:  % POSITIVE  STAINING INTENSITY  ESTROGEN RECEPTOR             95 MODERATE/STRONG (2+/3+) PROGESTERONE RECEPTOR    70 MODERATE/STRONG (2+/3+) Ki-67   60  Comment: No current consensus exists as to the methodological evaluation, optimal cutoff and algorithm of clinical usage of the Ki-67 labeling proliferative index. Proposed cutoffs noted in the literature vary from:  Unfavorable    >10% or >14% or >20% or 30% ([place]). Intermediate    10-14%, 10-20%, 16-30% ([place]). Favorable     <10% or <15% ([place]). A positive test result is defined as positive nuclear staining in >1% of tumor cells.                              A negative test result is defined as nuclear staining in <1% of tumor cells.  Marker           Result  Interpretation HER2               0 Negative  Pt is c/o pain in her left breast. Menstrual cycle is delayed over a week.   [de-identified] : IDC [de-identified] : /[place] + Her2 neg [de-identified] : 3/5/25 Pt being evaluated via Televisit. Consent obtained  3/17/25 Pt is here for follow up. She had MRI breast and mammaprint was ordered  6/2/25  Patient is here for follow up for Breast CA Completed Lumpectomy revision on 5/20/25 Completed [facility] consult on 5/13/25 Last menstrual period on 6/2/25

## 2025-06-13 NOTE — CONSULT LETTER
[Dear  ___] : Dear  [unfilled], [Courtesy Letter:] : I had the pleasure of seeing your patient, [unfilled], in my office today. [Please see my note below.] : Please see my note below. [Consult Closing:] : Thank you very much for allowing me to participate in the care of this patient.  If you have any questions, please do not hesitate to contact me. [Sincerely,] : Sincerely, [DrNeto  ___] : Dr. CHOE [DrNeto ___] : Dr. CHOE [___] : [unfilled] [FreeTextEntry3] : Joshua Coronel MD  [facility] [place]/[facility] Attending Physician [facility] [phone]

## 2025-06-13 NOTE — ASSESSMENT
[FreeTextEntry1] : In summary this is a 47 yr old premenopausal woman with pT1c, p(sn)N0(i+), pMx ER+ MS+Her2-ve Ki-67: 60%, poorly diff IDC of left breast s/p left breast lumpectomy on 4/11/25. Pt needed rexcision d/t positive margins done on 5/20/25 MAMMAPRINT SHOWS HIGH RISK -1 LUMINAL subtype B  PLAN The oncologic history and management course thus far were reviewed in detail with the pt   We had a long discussion reviewing the natural history, staging and rationale for systemic treatment. I explained that the treatment of non-metastatic breast cancer is approached by 2 general principles  i.e local control and systemic control. Given the MP- HIGH RISK -1 result she would benefit from systemic chemotherapy , absolute benefit 6% ( 3-8%) in addition to hormonal therapy.  We explained that the goal of adjuvant systemic therapy is to reduce the risk of systemic recurrence or development of metastatic disease. We explained that metastatic disease although treatable is not curable. The object of adjuvant therapy is to reduce the risk of recurrence and mortality from metastatic disease.   Pt would like to avoid chemotherapy if possible. I discussed  trial (OFSET) with her which is testing OFS/AI with or without chemotherapy in premenopausal women with Oncotype RS </=25. She wanted to participate and was consented. Oncotype will be ordered  Finally, we discussed the role of endocrine therapy which reduces the risk of both local and systemic recurrence and increases overall survival among women with HR + BC and should be strongly considered as adjuvant therapy for majority of women with ER/MS + BC. Since she is premenopausal, she will receive AI+ OFS . Last menstrual period is 6/2/25    Obtain pregnancy test ( needed for trial)  Pt had many questions which were addressed in detail. Adjuvant RT is indicated, follow with Rad/Onc  RTC in 3 weeks

## 2025-06-13 NOTE — CONSULT LETTER
[Dear  ___] : Dear  [unfilled], [Courtesy Letter:] : I had the pleasure of seeing your patient, [unfilled], in my office today. [Please see my note below.] : Please see my note below. [Consult Closing:] : Thank you very much for allowing me to participate in the care of this patient.  If you have any questions, please do not hesitate to contact me. [Sincerely,] : Sincerely, [DrNeto  ___] : Dr. CHOE [DrNeto ___] : Dr. CHOE [___] : [unfilled] [FreeTextEntry3] : Joshua Coronel MD  [facility] Attending Physician [facility] [phone]

## 2025-06-13 NOTE — ASSESSMENT
[FreeTextEntry1] : In summary this is a 47 yr old premenopausal woman with pT1c, p(sn)N0(i+), pMx ER+ IN+Her2-ve Ki-67: 60%, poorly diff IDC of left breast s/p left breast lumpectomy on 4/11/25. Pt needed rexcision d/t positive margins done on 5/20/25 MAMMAPRINT SHOWS HIGH RISK -1 LUMINAL subtype B  PLAN The oncologic history and management course thus far were reviewed in detail with the pt   We had a long discussion reviewing the natural history, staging and rationale for systemic treatment. I explained that the treatment of non-metastatic breast cancer is approached by 2 general principles  i.e local control and systemic control. Given the MP- HIGH RISK -1 result she would benefit from systemic chemotherapy , absolute benefit 6% ( 3-8%) in addition to hormonal therapy.  We explained that the goal of adjuvant systemic therapy is to reduce the risk of systemic recurrence or development of metastatic disease. We explained that metastatic disease although treatable is not curable. The object of adjuvant therapy is to reduce the risk of recurrence and mortality from metastatic disease.   Pt would like to avoid chemotherapy if possible. I discussed  trial (OFSET) with her which is testing OFS/AI with or without chemotherapy in premenopausal women with Oncotype RS </=25. She wanted to participate and was consented. Oncotype will be ordered  Finally, we discussed the role of endocrine therapy which reduces the risk of both local and systemic recurrence and increases overall survival among women with HR + BC and should be strongly considered as adjuvant therapy for majority of women with ER/IN + BC. Since she is premenopausal, she will receive AI+ OFS . Last menstrual period is 6/2/25    Obtain pregnancy test ( needed for trial)  Pt had many questions which were addressed in detail. Adjuvant RT is indicated, follow with Rad/Onc  RTC in 3 weeks

## 2025-06-13 NOTE — CONSULT LETTER
[Dear  ___] : Dear  [unfilled], [Courtesy Letter:] : I had the pleasure of seeing your patient, [unfilled], in my office today. [Please see my note below.] : Please see my note below. [Consult Closing:] : Thank you very much for allowing me to participate in the care of this patient.  If you have any questions, please do not hesitate to contact me. [Sincerely,] : Sincerely, [DrNeto  ___] : Dr. CHOE [DrNeto ___] : Dr. CHOE [___] : [unfilled] [FreeTextEntry3] : Joshua Coronel MD  [facility] [place]/[facility] Attending Physician [facility] [phone]

## 2025-06-13 NOTE — ASSESSMENT
[FreeTextEntry1] : In summary this is a 47 yr old premenopausal woman with pT1c, p(sn)N0(i+), pMx ER+ HI+Her2-ve Ki-67: 60%, poorly diff IDC of left breast s/p left breast lumpectomy on 4/11/25. Pt needed rexcision d/t positive margins done on 5/20/25 MAMMAPRINT SHOWS HIGH RISK -1 LUMINAL subtype B  PLAN The oncologic history and management course thus far were reviewed in detail with the pt   We had a long discussion reviewing the natural history, staging and rationale for systemic treatment. I explained that the treatment of non-metastatic breast cancer is approached by 2 general principles  i.e local control and systemic control. Given the MP- HIGH RISK -1 result she would benefit from systemic chemotherapy , absolute benefit 6% ( 3-8%) in addition to hormonal therapy.  We explained that the goal of adjuvant systemic therapy is to reduce the risk of systemic recurrence or development of metastatic disease. We explained that metastatic disease although treatable is not curable. The object of adjuvant therapy is to reduce the risk of recurrence and mortality from metastatic disease.   Pt would like to avoid chemotherapy if possible. I discussed  trial (OFSET) with her which is testing OFS/AI with or without chemotherapy in premenopausal women with Oncotype RS </=25. She wanted to participate and was consented. Oncotype will be ordered  Finally, we discussed the role of endocrine therapy which reduces the risk of both local and systemic recurrence and increases overall survival among women with HR + BC and should be strongly considered as adjuvant therapy for majority of women with ER/HI + BC. Since she is premenopausal, she will receive AI+ OFS . Last menstrual period is 6/2/25    Obtain pregnancy test ( needed for trial)  Pt had many questions which were addressed in detail. Adjuvant RT is indicated, follow with Rad/Onc  RTC in 3 weeks

## 2025-06-13 NOTE — ASSESSMENT
[FreeTextEntry1] : In summary this is a 47 yr old premenopausal woman with pT1c, p(sn)N0(i+), pMx ER+ [place]+Her2-ve Ki-67: 60%, poorly diff IDC of left breast s/p left breast lumpectomy on 4/11/25. Pt needed rexcision d/t positive margins done on 5/20/25 MAMMAPRINT SHOWS HIGH RISK -1 LUMINAL subtype B  PLAN The oncologic history and management course thus far were reviewed in detail with the pt   We had a long discussion reviewing the natural history, staging and rationale for systemic treatment. I explained that the treatment of non-metastatic breast cancer is approached by 2 general principles  i.e local control and systemic control. Given the MP- HIGH RISK -1 result she would benefit from systemic chemotherapy , absolute benefit 6% ( 3-8%) in addition to hormonal therapy.  We explained that the goal of adjuvant systemic therapy is to reduce the risk of systemic recurrence or development of metastatic disease. We explained that metastatic disease although treatable is not curable. The object of adjuvant therapy is to reduce the risk of recurrence and mortality from metastatic disease.   Pt would like to avoid chemotherapy if possible. I discussed  trial (OFSET) with her which is testing OFS/AI with or without chemotherapy in premenopausal women with Oncotype RS </=25. She wanted to participate and was consented. Oncotype will be ordered  Finally, we discussed the role of endocrine therapy which reduces the risk of both local and systemic recurrence and increases overall survival among women with HR + BC and should be strongly considered as adjuvant therapy for majority of women with ER/TN + BC. Since she is premenopausal, she will receive AI+ OFS . Last menstrual period is 6/2/25    Obtain pregnancy test ( needed for trial)  Pt had many questions which were addressed in detail. Adjuvant RT is indicated, follow with Rad/Onc  RTC in 3 weeks

## 2025-06-13 NOTE — PHYSICAL EXAM
[Fully active, able to carry on all pre-disease performance without restriction] : Status 0 - Fully active, able to carry on all pre-disease performance without restriction [Normal] : affect appropriate [de-identified] : No palpable masses

## 2025-06-13 NOTE — PHYSICAL EXAM
[Fully active, able to carry on all pre-disease performance without restriction] : Status 0 - Fully active, able to carry on all pre-disease performance without restriction [Normal] : affect appropriate [de-identified] : No palpable masses

## 2025-06-13 NOTE — HISTORY OF PRESENT ILLNESS
[Disease: _____________________] : Disease: [unfilled] [T: ___] : T[unfilled] [N: ___] : N[unfilled] [de-identified] :  This is a 47 year old woman who is being evaluated for breast cancer.  She was noted on screening mammogram (1/30/25) to have focal asymmetry in the left. Diagnostic mammogram and ultrasound on 2/10/25 showed at the 10-11 o'clock 7cm from the nipple an irregular mass measuring 0.8cm x 0.9cm x 1.3cm. 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 It was ER: 95 moderate/strong(2+/3+) /[place]: 70 moderate/strong (2+/3+)/HER 2: 0 NEG. Ki-67 index was 60%.  She recently had an appointment with Dr. Canela to discuss surgical options and was ordered an MRI of B/L breast w/wo contrast. Today she is here to discuss medical options for treatment.  Work up is as follows:  1/30/25 B/L Screening Mammogram  MAMMOGRAM FINDINGS: Mammography was performed including the following views: bilateral craniocaudal with tomosynthesis, bilateral mediolateral oblique with tomosynthesis.  The examination includes digital synthetic 2D and digital tomosynthesis 3D images. Additional imaging analysis was performed using CAD (computer-aided detection) software. The breasts are heterogeneously dense, which may obscure small masses. Finding 1:  There is a focal asymmetry seen in the medial left breast. Finding 2:  There are stable circumscribed masses seen in both breasts. IMPRESSION: Finding 1:  Focal asymmetry in the left breast requires additional evaluation. RECOMMENDATION: Patient will be recalled for additional mammographic views and breast ultrasound. Finding 2:  Stable masses in both breasts are benign finding. ASSESSMENT: BI-RADS Category 0:  Incomplete: Needs Additional Imaging Evaluation  2/10/25 Left Diagnostic Mammogram and US  Targeted Left Breast Sonogram: At the 10-11 o'clock location 7 cm from the nipple corresponding to mammographic findings is an irregular hypoechoic mass measuring 0.8 cm x 0.9 cm x 1.3 cm. An ultrasound guided core biopsy is recommended. Evaluation of the left axilla demonstrates normal-appearing lymph nodes. Impression: Left breast mass seen mammographically and sonographically at the 10-11 o'clock location 7 cm from the nipple as above. An ultrasound guided core biopsy is recommended. Recommendation: Ultrasound guided biopsy. BI-RADS Category 4: Suspicious  2/27/25 US Core Biopsy of the Left Breast Pathology:  BREAST, LEFT 10:00 N7 MASS, ULTRASOUND-GUIDED NEEDLE CORE BIOPSIES: - INVASIVE POORLY DIFFERENTIATED DUCTAL CARCINOMA WITH AN ASSOCIATED CHRONIC LYMPHOPLASMACYTIC CELL INFLAMMATORY INFILTRATE. Malignant concordant histology. Recommendation: Surgical consultation.  Addendum Immunohistochemical studies were performed at [facility], [address] [place] [address], [facility] (see NOTE).  The results are as follows:  % POSITIVE  STAINING INTENSITY  ESTROGEN RECEPTOR             95 MODERATE/STRONG (2+/3+) PROGESTERONE RECEPTOR    70 MODERATE/STRONG (2+/3+) Ki-67   60  Comment: No current consensus exists as to the methodological evaluation, optimal cutoff and algorithm of clinical usage of the Ki-67 labeling proliferative index. Proposed cutoffs noted in the literature vary from:  Unfavorable    >10% or >14% or >20% or 30% ([place]). Intermediate    10-14%, 10-20%, 16-30% ([place]). Favorable     <10% or <15% ([place]). A positive test result is defined as positive nuclear staining in >1% of tumor cells.                              A negative test result is defined as nuclear staining in <1% of tumor cells.  Marker           Result  Interpretation HER2               0 Negative  Pt is c/o pain in her left breast. Menstrual cycle is delayed over a week.   [de-identified] : IDC [de-identified] : /IN + Her2 neg [de-identified] : 3/5/25 Pt being evaluated via Televisit. Consent obtained  3/17/25 Pt is here for follow up. She had MRI breast and mammaprint was ordered  6/2/25  Patient is here for follow up for Breast CA Completed Lumpectomy revision on 5/20/25 Completed [facility] consult on 5/13/25 Last menstrual period on 6/2/25

## 2025-06-13 NOTE — ASSESSMENT
[FreeTextEntry1] : In summary this is a 47 yr old premenopausal woman with pT1c, p(sn)N0(i+), pMx ER+ [place]+Her2-ve Ki-67: 60%, poorly diff IDC of left breast s/p left breast lumpectomy on 4/11/25. Pt needed rexcision d/t positive margins done on 5/20/25 MAMMAPRINT SHOWS HIGH RISK -1 LUMINAL subtype B  PLAN The oncologic history and management course thus far were reviewed in detail with the pt   We had a long discussion reviewing the natural history, staging and rationale for systemic treatment. I explained that the treatment of non-metastatic breast cancer is approached by 2 general principles  i.e local control and systemic control. Given the MP- HIGH RISK -1 result she would benefit from systemic chemotherapy , absolute benefit 6% ( 3-8%) in addition to hormonal therapy.  We explained that the goal of adjuvant systemic therapy is to reduce the risk of systemic recurrence or development of metastatic disease. We explained that metastatic disease although treatable is not curable. The object of adjuvant therapy is to reduce the risk of recurrence and mortality from metastatic disease.   Pt would like to avoid chemotherapy if possible. I discussed  trial (OFSET) with her which is testing OFS/AI with or without chemotherapy in premenopausal women with Oncotype RS </=25. She wanted to participate and was consented. Oncotype will be ordered  Finally, we discussed the role of endocrine therapy which reduces the risk of both local and systemic recurrence and increases overall survival among women with HR + BC and should be strongly considered as adjuvant therapy for majority of women with ER/[place] + BC. Since she is premenopausal, she will receive AI+ OFS . Last menstrual period is 6/2/25    Obtain pregnancy test ( needed for trial)  Pt had many questions which were addressed in detail. Adjuvant RT is indicated, follow with Rad/Onc  RTC in 3 weeks

## 2025-06-13 NOTE — PHYSICAL EXAM
[Fully active, able to carry on all pre-disease performance without restriction] : Status 0 - Fully active, able to carry on all pre-disease performance without restriction [Normal] : affect appropriate [de-identified] : No palpable masses

## 2025-06-13 NOTE — PHYSICAL EXAM
[Fully active, able to carry on all pre-disease performance without restriction] : Status 0 - Fully active, able to carry on all pre-disease performance without restriction [Normal] : affect appropriate [de-identified] : No palpable masses

## 2025-06-13 NOTE — HISTORY OF PRESENT ILLNESS
[Disease: _____________________] : Disease: [unfilled] [T: ___] : T[unfilled] [N: ___] : N[unfilled] [de-identified] : IDC [de-identified] :  This is a 47 year old woman who is being evaluated for breast cancer.  She was noted on screening mammogram (1/30/25) to have focal asymmetry in the left. Diagnostic mammogram and ultrasound on 2/10/25 showed at the 10-11 o'clock 7cm from the nipple an irregular mass measuring 0.8cm x 0.9cm x 1.3cm. 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 It was ER: 95 moderate/strong(2+/3+) /[place]: 70 moderate/strong (2+/3+)/HER 2: 0 NEG. Ki-67 index was 60%.  She recently had an appointment with Dr. Canela to discuss surgical options and was ordered an MRI of B/L breast w/wo contrast. Today she is here to discuss medical options for treatment.  Work up is as follows:  1/30/25 B/L Screening Mammogram  MAMMOGRAM FINDINGS: Mammography was performed including the following views: bilateral craniocaudal with tomosynthesis, bilateral mediolateral oblique with tomosynthesis.  The examination includes digital synthetic 2D and digital tomosynthesis 3D images. Additional imaging analysis was performed using CAD (computer-aided detection) software. The breasts are heterogeneously dense, which may obscure small masses. Finding 1:  There is a focal asymmetry seen in the medial left breast. Finding 2:  There are stable circumscribed masses seen in both breasts. IMPRESSION: Finding 1:  Focal asymmetry in the left breast requires additional evaluation. RECOMMENDATION: Patient will be recalled for additional mammographic views and breast ultrasound. Finding 2:  Stable masses in both breasts are benign finding. ASSESSMENT: BI-RADS Category 0:  Incomplete: Needs Additional Imaging Evaluation  2/10/25 Left Diagnostic Mammogram and US  Targeted Left Breast Sonogram: At the 10-11 o'clock location 7 cm from the nipple corresponding to mammographic findings is an irregular hypoechoic mass measuring 0.8 cm x 0.9 cm x 1.3 cm. An ultrasound guided core biopsy is recommended. Evaluation of the left axilla demonstrates normal-appearing lymph nodes. Impression: Left breast mass seen mammographically and sonographically at the 10-11 o'clock location 7 cm from the nipple as above. An ultrasound guided core biopsy is recommended. Recommendation: Ultrasound guided biopsy. BI-RADS Category 4: Suspicious  2/27/25 US Core Biopsy of the Left Breast Pathology:  BREAST, LEFT 10:00 N7 MASS, ULTRASOUND-GUIDED NEEDLE CORE BIOPSIES: - INVASIVE POORLY DIFFERENTIATED DUCTAL CARCINOMA WITH AN ASSOCIATED CHRONIC LYMPHOPLASMACYTIC CELL INFLAMMATORY INFILTRATE. Malignant concordant histology. Recommendation: Surgical consultation.  Addendum Immunohistochemical studies were performed at [facility], [address] [place] [address], [facility] (see NOTE).  The results are as follows:  % POSITIVE  STAINING INTENSITY  ESTROGEN RECEPTOR             95 MODERATE/STRONG (2+/3+) PROGESTERONE RECEPTOR    70 MODERATE/STRONG (2+/3+) Ki-67   60  Comment: No current consensus exists as to the methodological evaluation, optimal cutoff and algorithm of clinical usage of the Ki-67 labeling proliferative index. Proposed cutoffs noted in the literature vary from:  Unfavorable    >10% or >14% or >20% or 30% ([place]). Intermediate    10-14%, 10-20%, 16-30% ([place]). Favorable     <10% or <15% ([place]). A positive test result is defined as positive nuclear staining in >1% of tumor cells.                              A negative test result is defined as nuclear staining in <1% of tumor cells.  Marker           Result  Interpretation HER2               0 Negative  Pt is c/o pain in her left breast. Menstrual cycle is delayed over a week.   [de-identified] : /MD + Her2 neg [de-identified] : 3/5/25 Pt being evaluated via Televisit. Consent obtained  3/17/25 Pt is here for follow up. She had MRI breast and mammaprint was ordered  6/2/25  Patient is here for follow up for Breast CA Completed Lumpectomy revision on 5/20/25 [place] [facility] [place]13/25 Last menstrual period on 6/2/25

## 2025-06-13 NOTE — PHYSICAL EXAM
[Fully active, able to carry on all pre-disease performance without restriction] : Status 0 - Fully active, able to carry on all pre-disease performance without restriction [Normal] : affect appropriate [de-identified] : No palpable masses

## 2025-06-19 NOTE — ASSESSMENT
[FreeTextEntry1] : In summary this is a 47 yr old premenopausal woman with pT1c, p(sn)N0(i+), pMx ER+ [place]+Her2-ve Ki-67: 60%, poorly diff IDC of left breast s/p left breast lumpectomy on 4/11/25. Pt needed rexcision d/t positive margins done on 5/20/25 MAMMAPRINT SHOWS HIGH RISK -1 LUMINAL subtype B Oncotype RS 32  PLAN The oncologic history and management course thus far were reviewed in detail with the pt   We had a long discussion reviewing the natural history, staging and rationale for systemic treatment. I explained that the treatment of non-metastatic breast cancer is approached by 2 general principles  i.e local control and systemic control. Given the MP- HIGH RISK -1 result she would benefit from systemic chemotherapy , absolute benefit 6% ( 3-8%) in addition to hormonal therapy.  We also discussed the Oncotype Dx results at length. Her Oncotype score is high and predicts significant benefit from chemotherapy.  We explained that the goal of adjuvant systemic therapy is to reduce the risk of systemic recurrence or development of metastatic disease. We explained that metastatic disease although treatable is not curable. The object of adjuvant therapy is to reduce the risk of recurrence and mortality from metastatic disease.   We recommended AC followed by Taxol Adriamycin and cytoxan will be administered every 2 weeks X 4 with Neulasta support followed by taxol weekly x 12  We discussed side effects which include but are not limited to nausea, vomiting, hair loss, bone marrow suppression, cytopenia, increased risk of infection, sepsis, diarrhea, fatigue, allergic reaction and peripheral neuropathy. Other side effects such as cardiac toxicity,bone marrow injury leading to MDS/AML, were also discussed, however these risks are very small. Overall the risk benefit ratio favors treatment with chemotherapy   Monitoring will be with each cycle Q 2 weeks with CBC (with differential and platelet count), serum electrolytes, serum creatinine and BUN, CrCl, LFTs; signs/symptoms of hypersensitivity reactions.   SUPPORTIVE MEASURES  Zofran 8mg Q 8 hrs prn for emesis and nausea.  Compazine 10 mg Q 6hrs prn  GI prophylaxis with PPI  Probiotics daily   Finally, we discussed the role of endocrine therapy which reduces the risk of both local and systemic recurrence and increases overall survival among women with HR + BC and should be strongly considered as adjuvant therapy for majority of women with ER/[place] + BC. Since she is premenopausal, she will receive AI+ OFS . Last menstrual period is 6/2/25 She will be started on Lupron today  Adjuvant RT is indicated, follow with Rad/Onc  Plan: Will need echo Will set up for port placement Labs today: CBC, CMP, PT/INR/PTT

## 2025-06-19 NOTE — HISTORY OF PRESENT ILLNESS
[de-identified] :  This is a 47 year old woman who is being evaluated for breast cancer.  She was noted on screening mammogram (1/30/25) to have focal asymmetry in the left. Diagnostic mammogram and ultrasound on 2/10/25 showed at the 10-11 o'clock 7cm from the nipple an irregular mass measuring 0.8cm x 0.9cm x 1.3cm. 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 It was ER: 95 moderate/strong(2+/3+) /[place]: 70 moderate/strong (2+/3+)/HER 2: 0 NEG. Ki-67 index was 60%.  She recently had an appointment with Dr. Canela to discuss surgical options and was ordered an MRI of B/L breast w/wo contrast. Today she is here to discuss medical options for treatment.  Work up is as follows:  1/30/25 B/L Screening Mammogram  MAMMOGRAM FINDINGS: Mammography was performed including the following views: bilateral craniocaudal with tomosynthesis, bilateral mediolateral oblique with tomosynthesis.  The examination includes digital synthetic 2D and digital tomosynthesis 3D images. Additional imaging analysis was performed using CAD (computer-aided detection) software. The breasts are heterogeneously dense, which may obscure small masses. Finding 1:  There is a focal asymmetry seen in the medial left breast. Finding 2:  There are stable circumscribed masses seen in both breasts. IMPRESSION: Finding 1:  Focal asymmetry in the left breast requires additional evaluation. RECOMMENDATION: Patient will be recalled for additional mammographic views and breast ultrasound. Finding 2:  Stable masses in both breasts are benign finding. ASSESSMENT: BI-RADS Category 0:  Incomplete: Needs Additional Imaging Evaluation  2/10/25 Left Diagnostic Mammogram and US  Targeted Left Breast Sonogram: At the 10-11 o'clock location 7 cm from the nipple corresponding to mammographic findings is an irregular hypoechoic mass measuring 0.8 cm x 0.9 cm x 1.3 cm. An ultrasound guided core biopsy is recommended. Evaluation of the left axilla demonstrates normal-appearing lymph nodes. Impression: Left breast mass seen mammographically and sonographically at the 10-11 o'clock location 7 cm from the nipple as above. An ultrasound guided core biopsy is recommended. Recommendation: Ultrasound guided biopsy. BI-RADS Category 4: Suspicious  2/27/25 US Core Biopsy of the Left Breast Pathology:  BREAST, LEFT 10:00 N7 MASS, ULTRASOUND-GUIDED NEEDLE CORE BIOPSIES: - INVASIVE POORLY DIFFERENTIATED DUCTAL CARCINOMA WITH AN ASSOCIATED CHRONIC LYMPHOPLASMACYTIC CELL INFLAMMATORY INFILTRATE. Malignant concordant histology. Recommendation: Surgical consultation.  Addendum Immunohistochemical studies were performed at [facility], [address] [place] [address], [facility] (see NOTE).  The results are as follows:  % POSITIVE  STAINING INTENSITY  ESTROGEN RECEPTOR             95 MODERATE/STRONG (2+/3+) PROGESTERONE RECEPTOR    70 MODERATE/STRONG (2+/3+) Ki-67   60  Comment: No current consensus exists as to the methodological evaluation, optimal cutoff and algorithm of clinical usage of the Ki-67 labeling proliferative index. Proposed cutoffs noted in the literature vary from:  Unfavorable    >10% or >14% or >20% or 30% ([place]). Intermediate    10-14%, 10-20%, 16-30% ([place]). Favorable     <10% or <15% ([place]). A positive test result is defined as positive nuclear staining in >1% of tumor cells.                              A negative test result is defined as nuclear staining in <1% of tumor cells.  Marker           Result  Interpretation HER2               0 Negative  Pt is c/o pain in her left breast. Menstrual cycle is delayed over a week.   [de-identified] : IDC [de-identified] : /DC + Her2 neg [de-identified] : 3/5/25 Pt being evaluated via Televisit. Consent obtained  3/17/25 Pt is here for follow up. She had MRI breast and mammaprint was ordered  6/2/25  Patient is here for follow up for Breast CA Completed Lumpectomy revision on 5/20/25 [place] [facility] [place]25 Last menstrual period on 6/2/25 6/16/25 Pt is here for follow up. Her Oncotype has resulted and was >26 therefore will not be a candidate for OFSET trial. She feels well.

## 2025-06-19 NOTE — HISTORY OF PRESENT ILLNESS
[de-identified] :  This is a 47 year old woman who is being evaluated for breast cancer.  She was noted on screening mammogram (1/30/25) to have focal asymmetry in the left. Diagnostic mammogram and ultrasound on 2/10/25 showed at the 10-11 o'clock 7cm from the nipple an irregular mass measuring 0.8cm x 0.9cm x 1.3cm. 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 It was ER: 95 moderate/strong(2+/3+) /[place]: 70 moderate/strong (2+/3+)/HER 2: 0 NEG. Ki-67 index was 60%.  She recently had an appointment with Dr. Canela to discuss surgical options and was ordered an MRI of B/L breast w/wo contrast. Today she is here to discuss medical options for treatment.  Work up is as follows:  1/30/25 B/L Screening Mammogram  MAMMOGRAM FINDINGS: Mammography was performed including the following views: bilateral craniocaudal with tomosynthesis, bilateral mediolateral oblique with tomosynthesis.  The examination includes digital synthetic 2D and digital tomosynthesis 3D images. Additional imaging analysis was performed using CAD (computer-aided detection) software. The breasts are heterogeneously dense, which may obscure small masses. Finding 1:  There is a focal asymmetry seen in the medial left breast. Finding 2:  There are stable circumscribed masses seen in both breasts. IMPRESSION: Finding 1:  Focal asymmetry in the left breast requires additional evaluation. RECOMMENDATION: Patient will be recalled for additional mammographic views and breast ultrasound. Finding 2:  Stable masses in both breasts are benign finding. ASSESSMENT: BI-RADS Category 0:  Incomplete: Needs Additional Imaging Evaluation  2/10/25 Left Diagnostic Mammogram and US  Targeted Left Breast Sonogram: At the 10-11 o'clock location 7 cm from the nipple corresponding to mammographic findings is an irregular hypoechoic mass measuring 0.8 cm x 0.9 cm x 1.3 cm. An ultrasound guided core biopsy is recommended. Evaluation of the left axilla demonstrates normal-appearing lymph nodes. Impression: Left breast mass seen mammographically and sonographically at the 10-11 o'clock location 7 cm from the nipple as above. An ultrasound guided core biopsy is recommended. Recommendation: Ultrasound guided biopsy. BI-RADS Category 4: Suspicious  2/27/25 US Core Biopsy of the Left Breast Pathology:  BREAST, LEFT 10:00 N7 MASS, ULTRASOUND-GUIDED NEEDLE CORE BIOPSIES: - INVASIVE POORLY DIFFERENTIATED DUCTAL CARCINOMA WITH AN ASSOCIATED CHRONIC LYMPHOPLASMACYTIC CELL INFLAMMATORY INFILTRATE. Malignant concordant histology. Recommendation: Surgical consultation.  Addendum Immunohistochemical studies were performed at [facility], [address] [place] [address], [place] (see NOTE).  The results are as follows:  % POSITIVE  STAINING INTENSITY  ESTROGEN RECEPTOR             95 MODERATE/STRONG (2+/3+) PROGESTERONE RECEPTOR    70 MODERATE/STRONG (2+/3+) Ki-67   60  Comment: No current consensus exists as to the methodological evaluation, optimal cutoff and algorithm of clinical usage of the Ki-67 labeling proliferative index. Proposed cutoffs noted in the literature vary from:  Unfavorable    >10% or >14% or >20% or 30% ([place]). Intermediate    10-14%, 10-20%, 16-30% ([place]). Favorable     <10% or <15% ([place]). A positive test result is defined as positive nuclear staining in >1% of tumor cells.                              A negative test result is defined as nuclear staining in <1% of tumor cells.  Marker           Result  Interpretation HER2               0 Negative  Pt is c/o pain in her left breast. Menstrual cycle is delayed over a week.   [de-identified] : IDC [de-identified] : /TN + Her2 neg [de-identified] : 3/5/25 Pt being evaluated via Televisit. Consent obtained  3/17/25 Pt is here for follow up. She had MRI breast and mammaprint was ordered  6/2/25  Patient is here for follow up for Breast CA Completed Lumpectomy revision on 5/20/25 Completed [facility] consult on 5/13/25 Last menstrual period on 6/2/25 6/16/25 Pt is here for follow up. Her Oncotype has resulted and was >26 therefore will not be a candidate for OFSET trial. She feels well.

## 2025-06-19 NOTE — HISTORY OF PRESENT ILLNESS
[de-identified] :  This is a 47 year old woman who is being evaluated for breast cancer.  She was noted on screening mammogram (1/30/25) to have focal asymmetry in the left. Diagnostic mammogram and ultrasound on 2/10/25 showed at the 10-11 o'clock 7cm from the nipple an irregular mass measuring 0.8cm x 0.9cm x 1.3cm. 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 It was ER: 95 moderate/strong(2+/3+) /[place]: 70 moderate/strong (2+/3+)/HER 2: 0 NEG. Ki-67 index was 60%.  She recently had an appointment with Dr. Canela to discuss surgical options and was ordered an MRI of B/L breast w/wo contrast. Today she is here to discuss medical options for treatment.  Work up is as follows:  1/30/25 B/L Screening Mammogram  MAMMOGRAM FINDINGS: Mammography was performed including the following views: bilateral craniocaudal with tomosynthesis, bilateral mediolateral oblique with tomosynthesis.  The examination includes digital synthetic 2D and digital tomosynthesis 3D images. Additional imaging analysis was performed using CAD (computer-aided detection) software. The breasts are heterogeneously dense, which may obscure small masses. Finding 1:  There is a focal asymmetry seen in the medial left breast. Finding 2:  There are stable circumscribed masses seen in both breasts. IMPRESSION: Finding 1:  Focal asymmetry in the left breast requires additional evaluation. RECOMMENDATION: Patient will be recalled for additional mammographic views and breast ultrasound. Finding 2:  Stable masses in both breasts are benign finding. ASSESSMENT: BI-RADS Category 0:  Incomplete: Needs Additional Imaging Evaluation  2/10/25 Left Diagnostic Mammogram and US  Targeted Left Breast Sonogram: At the 10-11 o'clock location 7 cm from the nipple corresponding to mammographic findings is an irregular hypoechoic mass measuring 0.8 cm x 0.9 cm x 1.3 cm. An ultrasound guided core biopsy is recommended. Evaluation of the left axilla demonstrates normal-appearing lymph nodes. Impression: Left breast mass seen mammographically and sonographically at the 10-11 o'clock location 7 cm from the nipple as above. An ultrasound guided core biopsy is recommended. Recommendation: Ultrasound guided biopsy. BI-RADS Category 4: Suspicious  2/27/25 US Core Biopsy of the Left Breast Pathology:  BREAST, LEFT 10:00 N7 MASS, ULTRASOUND-GUIDED NEEDLE CORE BIOPSIES: - INVASIVE POORLY DIFFERENTIATED DUCTAL CARCINOMA WITH AN ASSOCIATED CHRONIC LYMPHOPLASMACYTIC CELL INFLAMMATORY INFILTRATE. Malignant concordant histology. Recommendation: Surgical consultation.  Addendum Immunohistochemical studies were performed at [facility], [address] [place] [address], [facility] (see NOTE).  The results are as follows:  % POSITIVE  STAINING INTENSITY  ESTROGEN RECEPTOR             95 MODERATE/STRONG (2+/3+) PROGESTERONE RECEPTOR    70 MODERATE/STRONG (2+/3+) Ki-67   60  Comment: No current consensus exists as to the methodological evaluation, optimal cutoff and algorithm of clinical usage of the Ki-67 labeling proliferative index. Proposed cutoffs noted in the literature vary from:  Unfavorable    >10% or >14% or >20% or 30% ([place]). Intermediate    10-14%, 10-20%, 16-30% ([place]). Favorable     <10% or <15% ([place]). A positive test result is defined as positive nuclear staining in >1% of tumor cells.                              A negative test result is defined as nuclear staining in <1% of tumor cells.  Marker           Result  Interpretation HER2               0 Negative  Pt is c/o pain in her left breast. Menstrual cycle is delayed over a week.   [de-identified] : IDC [de-identified] : /AL + Her2 neg [de-identified] : 3/5/25 Pt being evaluated via Televisit. Consent obtained  3/17/25 Pt is here for follow up. She had MRI breast and mammaprint was ordered  6/2/25  Patient is here for follow up for Breast CA Completed Lumpectomy revision on 5/20/25 Completed [facility] consult on 5/13/25 Last menstrual period on 6/2/25 6/16/25 Pt is here for follow up. Her Oncotype has resulted and was >26 therefore will not be a candidate for OFSET trial. She feels well.

## 2025-06-19 NOTE — ASSESSMENT
[FreeTextEntry1] : In summary this is a 47 yr old premenopausal woman with pT1c, p(sn)N0(i+), pMx ER+ MA+Her2-ve Ki-67: 60%, poorly diff IDC of left breast s/p left breast lumpectomy on 4/11/25. Pt needed rexcision d/t positive margins done on 5/20/25 MAMMAPRINT SHOWS HIGH RISK -1 LUMINAL subtype B Oncotype RS 32  PLAN The oncologic history and management course thus far were reviewed in detail with the pt   We had a long discussion reviewing the natural history, staging and rationale for systemic treatment. I explained that the treatment of non-metastatic breast cancer is approached by 2 general principles  i.e local control and systemic control. Given the MP- HIGH RISK -1 result she would benefit from systemic chemotherapy , absolute benefit 6% ( 3-8%) in addition to hormonal therapy.  We also discussed the Oncotype Dx results at length. Her Oncotype score is high and predicts significant benefit from chemotherapy.  We explained that the goal of adjuvant systemic therapy is to reduce the risk of systemic recurrence or development of metastatic disease. We explained that metastatic disease although treatable is not curable. The object of adjuvant therapy is to reduce the risk of recurrence and mortality from metastatic disease.   We recommended AC followed by Taxol Adriamycin and cytoxan will be administered every 2 weeks X 4 with Neulasta support followed by taxol weekly x 12  We discussed side effects which include but are not limited to nausea, vomiting, hair loss, bone marrow suppression, cytopenia, increased risk of infection, sepsis, diarrhea, fatigue, allergic reaction and peripheral neuropathy. Other side effects such as cardiac toxicity,bone marrow injury leading to MDS/AML, were also discussed, however these risks are very small. Overall the risk benefit ratio favors treatment with chemotherapy   Monitoring will be with each cycle Q 2 weeks with CBC (with differential and platelet count), serum electrolytes, serum creatinine and BUN, CrCl, LFTs; signs/symptoms of hypersensitivity reactions.   SUPPORTIVE MEASURES  Zofran 8mg Q 8 hrs prn for emesis and nausea.  Compazine 10 mg Q 6hrs prn  GI prophylaxis with PPI  Probiotics daily   Finally, we discussed the role of endocrine therapy which reduces the risk of both local and systemic recurrence and increases overall survival among women with HR + BC and should be strongly considered as adjuvant therapy for majority of women with ER/MA + BC. Since she is premenopausal, she will receive AI+ OFS . Last menstrual period is 6/2/25 She will be started on Lupron today  Adjuvant RT is indicated, follow with Rad/Onc  Plan: Will need echo Will set up for port placement Labs today: CBC, CMP, PT/INR/PTT

## 2025-06-26 NOTE — HISTORY OF PRESENT ILLNESS
[place]  Administration of Anesthesia  DAVE Hoda DAIGLE Kehinde agree to be cared for by a physician anesthesiologist alone and/or with a nurse anesthetist, who is specially trained to monitor me and give me medicine to put me to sleep or keep me comfortable during my procedure    I understand that my anesthesiologist and/or anesthetist is not an employee or agent of [facility] or [employer]. He or she works for [facility] [facility]    As the patient asking for anesthesia services, I agree to:  Allow the anesthesiologist (anesthesia doctor) to give me medicine and do additional procedures as necessary. Some examples are: Starting or using an “IV” to give me medicine, fluids or blood during my procedure, and having a breathing tube placed to help me breathe when I’m asleep (intubation). In the event that my heart stops working properly, I understand that my anesthesiologist will make every effort to sustain my life, unless otherwise directed by [facility] Do Not Resuscitate documents.  Tell my anesthesia doctor before my procedure:  If I am pregnant.  The last time that I ate or drank.  iii. All of the medicines I take (including prescriptions, herbal supplements, and pills I can buy without a prescription (including street drugs/illegal medications). Failure to inform my anesthesiologist about these medicines may increase my risk of anesthetic complications.  iv.If I am allergic to anything or have had a reaction to anesthesia before.  I understand how the anesthesia medicine will help me (benefits).  I understand that with any type of anesthesia medicine there are risks:  The most common risks are: nausea, vomiting, sore throat, muscle soreness, damage to my eyes, mouth, or teeth (from breathing tube placement).  Rare risks include: remembering what happened during my procedure, allergic reactions to medications, injury to my airway, heart, lungs, vision, nerves, or  [de-identified] :  This is a 47 year old woman who is being evaluated for breast cancer.  She was noted on screening mammogram (1/30/25) to have focal asymmetry in the left. Diagnostic mammogram and ultrasound on 2/10/25 showed at the 10-11 o'clock 7cm from the nipple an irregular mass measuring 0.8cm x 0.9cm x 1.3cm. 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 It was ER: 95 moderate/strong(2+/3+) /[place]: 70 moderate/strong (2+/3+)/HER 2: 0 NEG. Ki-67 index was 60%.  She recently had an appointment with Dr. Canela to discuss surgical options and was ordered an MRI of B/L breast w/wo contrast. Today she is here to discuss medical options for treatment.  Work up is as follows:  1/30/25 B/L Screening Mammogram  MAMMOGRAM FINDINGS: Mammography was performed including the following views: bilateral craniocaudal with tomosynthesis, bilateral mediolateral oblique with tomosynthesis.  The examination includes digital synthetic 2D and digital tomosynthesis 3D images. Additional imaging analysis was performed using CAD (computer-aided detection) software. The breasts are heterogeneously dense, which may obscure small masses. Finding 1:  There is a focal asymmetry seen in the medial left breast. Finding 2:  There are stable circumscribed masses seen in both breasts. IMPRESSION: Finding 1:  Focal asymmetry in the left breast requires additional evaluation. RECOMMENDATION: Patient will be recalled for additional mammographic views and breast ultrasound. Finding 2:  Stable masses in both breasts are benign finding. ASSESSMENT: BI-RADS Category 0:  Incomplete: Needs Additional Imaging Evaluation  2/10/25 Left Diagnostic Mammogram and US  Targeted Left Breast Sonogram: At the 10-11 o'clock location 7 cm from the nipple corresponding to mammographic findings is an irregular hypoechoic mass measuring 0.8 cm x 0.9 cm x 1.3 cm. An ultrasound guided core biopsy is recommended. Evaluation of the left axilla demonstrates normal-appearing lymph nodes. Impression: Left breast mass seen mammographically and sonographically at the 10-11 o'clock location 7 cm from the nipple as above. An ultrasound guided core biopsy is recommended. Recommendation: Ultrasound guided biopsy. BI-RADS Category 4: Suspicious  2/27/25 US Core Biopsy of the Left Breast Pathology:  BREAST, LEFT 10:00 N7 MASS, ULTRASOUND-GUIDED NEEDLE CORE BIOPSIES: - INVASIVE POORLY DIFFERENTIATED DUCTAL CARCINOMA WITH AN ASSOCIATED CHRONIC LYMPHOPLASMACYTIC CELL INFLAMMATORY INFILTRATE. Malignant concordant histology. Recommendation: Surgical consultation.  Addendum Immunohistochemical studies were performed at [facility], [address] [place] [address], [facility] (see NOTE).  The results are as follows:  % POSITIVE  STAINING INTENSITY  ESTROGEN RECEPTOR             95 MODERATE/STRONG (2+/3+) PROGESTERONE RECEPTOR    70 MODERATE/STRONG (2+/3+) Ki-67   60  Comment: No current consensus exists as to the methodological evaluation, optimal cutoff and algorithm of clinical usage of the Ki-67 labeling proliferative index. Proposed cutoffs noted in the literature vary from:  Unfavorable    >10% or >14% or >20% or 30% ([place]). Intermediate    10-14%, 10-20%, 16-30% ([place]). Favorable     <10% or <15% ([place]). A positive test result is defined as positive nuclear staining in >1% of tumor cells.                              A negative test result is defined as nuclear staining in <1% of tumor cells.  Marker           Result  Interpretation HER2               0 Negative  Pt is c/o pain in her left breast. Menstrual cycle is delayed over a week.   muscles and in extremely rare instances death.  My doctor has explained to me other choices available to me for my care (alternatives).  Pregnant Patients (“epidural”):  I understand that the risks of having an epidural (medicine given into my back to help control pain during labor), include itching, low blood pressure, difficulty urinating, headache or slowing of the baby’s heart. Very rare risks include infection, bleeding, seizure, irregular heart rhythms and nerve injury.  Regional Anesthesia (“spinal”, “epidural”, & “nerve blocks”):  I understand that rare but potential complications include headache, bleeding, infection, seizure, irregular heart rhythms, and nerve injury.    _____________________________________________________________________________  Patient (or Representative) Signature/Relationship to Patient  Date   Time    _____________________________________________________________________________   Name (if used)    Language/Organization   Time    _____________________________________________________________________________  Nurse Anesthetist Signature     Date   Time  _____________________________________________________________________________  Anesthesiologist Signature     Date   Time  I have discussed the procedure and information above with the patient (or patient’s representative) and answered their questions. The patient or their representative has agreed to have anesthesia services.    _____________________________________________________________________________  Witness        Date   Time  I have verified that the signature is that of the patient or patient’s representative, and that it was signed before the procedure  Patient Name: Hoda Busby     : 1940                 Printed: 2024 at 7:26 AM    Medical Record #: [id number]                                            Page 1 of 1  ----------ANESTHESIA CONSENT----------     [de-identified] : IDC [de-identified] : /CT + Her2 neg [de-identified] : 3/5/25 Pt being evaluated via Televisit. Consent obtained  3/17/25 Pt is here for follow up. She had MRI breast and mammaprint was ordered  6/2/25  Patient is here for follow up for Breast CA Completed Lumpectomy revision on 5/20/25 Completed [facility] consult on 5/13/25 Last menstrual period on 6/2/25 6/16/25 Pt is here for follow up. Her Oncotype has resulted and was >26 therefore will not be a candidate for OFSET trial. She feels well.  6/26/25  Patient is here for follow up for breast cancer

## 2025-06-26 NOTE — ASSESSMENT
[FreeTextEntry1] : In summary this is a 47 yr old premenopausal woman with pT1c, p(sn)N0(i+), pMx ER+ [place]+Her2-ve Ki-67: 60%, poorly diff IDC of left breast s/p left breast lumpectomy on 4/11/25. Pt needed rexcision d/t positive margins done on 5/20/25 MAMMAPRINT SHOWS HIGH RISK -1 LUMINAL subtype B Oncotype RS 32  PLAN The oncologic history and management course thus far were reviewed in detail with the pt   We had a long discussion reviewing the natural history, staging and rationale for systemic treatment. I explained that the treatment of non-metastatic breast cancer is approached by 2 general principles  i.e local control and systemic control. Given the MP- HIGH RISK -1 result she would benefit from systemic chemotherapy , absolute benefit 6% ( 3-8%) in addition to hormonal therapy.  We also discussed the Oncotype Dx results at length. Her Oncotype score is high and predicts significant benefit from chemotherapy.  We explained that the goal of adjuvant systemic therapy is to reduce the risk of systemic recurrence or development of metastatic disease. We explained that metastatic disease although treatable is not curable. The object of adjuvant therapy is to reduce the risk of recurrence and mortality from metastatic disease.   We recommended AC followed by Taxol Adriamycin and cytoxan will be administered every 2 weeks X 4 with Neulasta support followed by taxol weekly x 12  We discussed side effects which include but are not limited to nausea, vomiting, hair loss, bone marrow suppression, cytopenia, increased risk of infection, sepsis, diarrhea, fatigue, allergic reaction and peripheral neuropathy. Other side effects such as cardiac toxicity,bone marrow injury leading to MDS/AML, were also discussed, however these risks are very small. Overall the risk benefit ratio favors treatment with chemotherapy   Monitoring will be with each cycle Q 2 weeks with CBC (with differential and platelet count), serum electrolytes, serum creatinine and BUN, CrCl, LFTs; signs/symptoms of hypersensitivity reactions.   SUPPORTIVE MEASURES  Zofran 8mg Q 8 hrs prn for emesis and nausea.  Compazine 10 mg Q 6hrs prn  GI prophylaxis with PPI  Probiotics daily   Finally, we discussed the role of endocrine therapy which reduces the risk of both local and systemic recurrence and increases overall survival among women with HR + BC and should be strongly considered as adjuvant therapy for majority of women with ER/UT + BC. Since she is premenopausal, she will receive AI+ OFS . Last menstrual period is 6/2/25 She will be started on Lupron today  Adjuvant RT is indicated, follow with Rad/Onc  Plan: Will need echo Will set up for port placement Labs today: CBC, CMP, PT/INR/PTT

## 2025-07-16 NOTE — HISTORY OF PRESENT ILLNESS
[FreeTextEntry1] : 48 yo female with recently dx breast ca Here for abn ECG eval prior to chemo initiation Recent echo - no sign VHD, nl EF No active cv complains Et is good Trying to loose weight

## 2025-07-16 NOTE — HISTORY OF PRESENT ILLNESS
[FreeTextEntry1] : 46 yo female with recently dx breast ca Here for abn ECG eval prior to chemo initiation Recent echo - no sign VHD, nl EF No active cv complains Et is good Trying to loose weight

## 2025-07-16 NOTE — ASSESSMENT
[FreeTextEntry1] : 46 yo female with pmhx and presentation as above ECG with ns stt changes echo reviewed No contraindications for Adriamycin rx at this point Will need surveillance echo s/s of chf discussed Card Onc consult with Dr. Narayanan suggested aggressive risk modif act as tolerated rtc prn